# Patient Record
Sex: MALE | Race: BLACK OR AFRICAN AMERICAN | NOT HISPANIC OR LATINO | Employment: FULL TIME | RURAL
[De-identification: names, ages, dates, MRNs, and addresses within clinical notes are randomized per-mention and may not be internally consistent; named-entity substitution may affect disease eponyms.]

---

## 2017-04-05 ENCOUNTER — HISTORICAL (OUTPATIENT)
Dept: ADMINISTRATIVE | Facility: HOSPITAL | Age: 42
End: 2017-04-05

## 2017-04-06 LAB
LAB AP CLINICAL INFORMATION: NORMAL
LAB AP DIAGNOSIS - HISTORICAL: NORMAL
LAB AP GROSS PATHOLOGY - HISTORICAL: NORMAL
LAB AP SPECIMEN SUBMITTED - HISTORICAL: NORMAL

## 2020-07-30 ENCOUNTER — HISTORICAL (OUTPATIENT)
Dept: ADMINISTRATIVE | Facility: HOSPITAL | Age: 45
End: 2020-07-30

## 2021-03-23 RX ORDER — FLUCONAZOLE 200 MG/1
200 TABLET ORAL DAILY
Qty: 5 TABLET | Refills: 2 | Status: SHIPPED | OUTPATIENT
Start: 2021-03-23 | End: 2021-11-11

## 2021-03-30 RX ORDER — DAPAGLIFLOZIN AND METFORMIN HYDROCHLORIDE 5; 1000 MG/1; MG/1
1 TABLET, FILM COATED, EXTENDED RELEASE ORAL 2 TIMES DAILY
Qty: 180 TABLET | Refills: 3 | Status: SHIPPED | OUTPATIENT
Start: 2021-03-30 | End: 2021-05-06 | Stop reason: SDUPTHER

## 2021-05-06 ENCOUNTER — OFFICE VISIT (OUTPATIENT)
Dept: DIABETES SERVICES | Facility: CLINIC | Age: 46
End: 2021-05-06
Payer: COMMERCIAL

## 2021-05-06 VITALS
OXYGEN SATURATION: 97 % | SYSTOLIC BLOOD PRESSURE: 146 MMHG | WEIGHT: 220.63 LBS | BODY MASS INDEX: 32.68 KG/M2 | RESPIRATION RATE: 16 BRPM | HEIGHT: 69 IN | DIASTOLIC BLOOD PRESSURE: 108 MMHG | HEART RATE: 86 BPM

## 2021-05-06 DIAGNOSIS — Z79.4 TYPE 2 DIABETES MELLITUS WITHOUT COMPLICATION, WITH LONG-TERM CURRENT USE OF INSULIN: Primary | ICD-10-CM

## 2021-05-06 DIAGNOSIS — E11.9 TYPE 2 DIABETES MELLITUS WITHOUT COMPLICATION, WITH LONG-TERM CURRENT USE OF INSULIN: Primary | ICD-10-CM

## 2021-05-06 DIAGNOSIS — I10 HYPERTENSION, UNSPECIFIED TYPE: ICD-10-CM

## 2021-05-06 LAB
GLUCOSE SERPL-MCNC: 65 MG/DL (ref 70–110)
HBA1C MFR BLD: 5.9 %

## 2021-05-06 PROCEDURE — 99213 OFFICE O/P EST LOW 20 MIN: CPT | Mod: S$PBB,,, | Performed by: NURSE PRACTITIONER

## 2021-05-06 PROCEDURE — 83036 HEMOGLOBIN GLYCOSYLATED A1C: CPT | Mod: PBBFAC | Performed by: NURSE PRACTITIONER

## 2021-05-06 PROCEDURE — 99213 PR OFFICE/OUTPT VISIT, EST, LEVL III, 20-29 MIN: ICD-10-PCS | Mod: S$PBB,,, | Performed by: NURSE PRACTITIONER

## 2021-05-06 PROCEDURE — 3008F PR BODY MASS INDEX (BMI) DOCUMENTED: ICD-10-PCS | Mod: ,,, | Performed by: NURSE PRACTITIONER

## 2021-05-06 PROCEDURE — 99213 OFFICE O/P EST LOW 20 MIN: CPT | Mod: PBBFAC | Performed by: NURSE PRACTITIONER

## 2021-05-06 PROCEDURE — 3008F BODY MASS INDEX DOCD: CPT | Mod: ,,, | Performed by: NURSE PRACTITIONER

## 2021-05-06 PROCEDURE — 82962 GLUCOSE BLOOD TEST: CPT | Mod: PBBFAC | Performed by: NURSE PRACTITIONER

## 2021-05-06 PROCEDURE — 99213 OFFICE O/P EST LOW 20 MIN: CPT | Mod: PBBFAC,,, | Performed by: NURSE PRACTITIONER

## 2021-05-06 RX ORDER — ASPIRIN 81 MG/1
81 TABLET ORAL DAILY
COMMUNITY

## 2021-05-06 RX ORDER — CHOLECALCIFEROL (VITAMIN D3) 25 MCG
1000 TABLET ORAL 2 TIMES DAILY
COMMUNITY

## 2021-05-06 RX ORDER — DAPAGLIFLOZIN AND METFORMIN HYDROCHLORIDE 5; 1000 MG/1; MG/1
1 TABLET, FILM COATED, EXTENDED RELEASE ORAL 2 TIMES DAILY
Qty: 180 TABLET | Refills: 3 | Status: SHIPPED | OUTPATIENT
Start: 2021-05-06 | End: 2022-01-18 | Stop reason: SDUPTHER

## 2021-05-06 RX ORDER — LISINOPRIL 5 MG/1
5 TABLET ORAL DAILY
COMMUNITY
End: 2023-03-22 | Stop reason: SDUPTHER

## 2021-05-18 PROBLEM — M25.562 LEFT KNEE PAIN: Status: ACTIVE | Noted: 2021-05-18

## 2021-05-18 PROBLEM — M25.531 RIGHT WRIST PAIN: Status: ACTIVE | Noted: 2021-05-18

## 2021-05-24 RX ORDER — AZITHROMYCIN 250 MG/1
TABLET, FILM COATED ORAL
Qty: 6 TABLET | Refills: 0 | Status: SHIPPED | OUTPATIENT
Start: 2021-05-24 | End: 2021-05-29

## 2021-05-24 RX ORDER — CETIRIZINE HYDROCHLORIDE, PSEUDOEPHEDRINE HYDROCHLORIDE 5; 120 MG/1; MG/1
5-120 TABLET, FILM COATED, EXTENDED RELEASE ORAL 2 TIMES DAILY
Qty: 60 TABLET | Refills: 5 | Status: SHIPPED | OUTPATIENT
Start: 2021-05-24 | End: 2021-06-03

## 2021-05-24 RX ORDER — HYDROCODONE BITARTRATE AND ACETAMINOPHEN 7.5; 325 MG/1; MG/1
1 TABLET ORAL EVERY 4 HOURS PRN
Qty: 84 TABLET | Refills: 0 | Status: SHIPPED | OUTPATIENT
Start: 2021-05-24 | End: 2021-11-24 | Stop reason: SDUPTHER

## 2021-06-24 RX ORDER — SYRING-NEEDL,DISP,INSUL,0.3 ML 31GX15/64"
1 SYRINGE, EMPTY DISPOSABLE MISCELLANEOUS DAILY
COMMUNITY
Start: 2021-03-29 | End: 2021-12-13

## 2021-06-24 RX ORDER — ATORVASTATIN CALCIUM 80 MG/1
1 TABLET, FILM COATED ORAL DAILY
COMMUNITY
Start: 2021-03-10 | End: 2021-09-13

## 2021-06-28 ENCOUNTER — TELEPHONE (OUTPATIENT)
Dept: PRIMARY CARE CLINIC | Facility: CLINIC | Age: 46
End: 2021-06-28

## 2021-06-28 ENCOUNTER — OFFICE VISIT (OUTPATIENT)
Dept: PRIMARY CARE CLINIC | Facility: CLINIC | Age: 46
End: 2021-06-28
Payer: COMMERCIAL

## 2021-06-28 VITALS
WEIGHT: 215 LBS | DIASTOLIC BLOOD PRESSURE: 80 MMHG | RESPIRATION RATE: 14 BRPM | HEART RATE: 80 BPM | HEIGHT: 69 IN | BODY MASS INDEX: 31.84 KG/M2 | SYSTOLIC BLOOD PRESSURE: 142 MMHG | OXYGEN SATURATION: 99 %

## 2021-06-28 DIAGNOSIS — I10 HYPERTENSION, UNSPECIFIED TYPE: ICD-10-CM

## 2021-06-28 DIAGNOSIS — M25.562 LEFT KNEE PAIN, UNSPECIFIED CHRONICITY: ICD-10-CM

## 2021-06-28 DIAGNOSIS — E11.9 TYPE 2 DIABETES MELLITUS WITHOUT COMPLICATION, WITH LONG-TERM CURRENT USE OF INSULIN: ICD-10-CM

## 2021-06-28 DIAGNOSIS — Z12.5 PROSTATE CANCER SCREENING: ICD-10-CM

## 2021-06-28 DIAGNOSIS — M25.531 RIGHT WRIST PAIN: Primary | ICD-10-CM

## 2021-06-28 DIAGNOSIS — Z79.4 TYPE 2 DIABETES MELLITUS WITHOUT COMPLICATION, WITH LONG-TERM CURRENT USE OF INSULIN: ICD-10-CM

## 2021-06-28 PROCEDURE — 3008F PR BODY MASS INDEX (BMI) DOCUMENTED: ICD-10-PCS | Mod: ,,, | Performed by: FAMILY MEDICINE

## 2021-06-28 PROCEDURE — 1125F PR PAIN SEVERITY QUANTIFIED, PAIN PRESENT: ICD-10-PCS | Mod: ,,, | Performed by: FAMILY MEDICINE

## 2021-06-28 PROCEDURE — 1125F AMNT PAIN NOTED PAIN PRSNT: CPT | Mod: ,,, | Performed by: FAMILY MEDICINE

## 2021-06-28 PROCEDURE — 3008F BODY MASS INDEX DOCD: CPT | Mod: ,,, | Performed by: FAMILY MEDICINE

## 2021-06-28 PROCEDURE — 99214 OFFICE O/P EST MOD 30 MIN: CPT | Mod: ,,, | Performed by: FAMILY MEDICINE

## 2021-06-28 PROCEDURE — 99214 PR OFFICE/OUTPT VISIT, EST, LEVL IV, 30-39 MIN: ICD-10-PCS | Mod: ,,, | Performed by: FAMILY MEDICINE

## 2021-08-26 ENCOUNTER — OFFICE VISIT (OUTPATIENT)
Dept: PRIMARY CARE CLINIC | Facility: CLINIC | Age: 46
End: 2021-08-26
Payer: COMMERCIAL

## 2021-08-26 VITALS
OXYGEN SATURATION: 99 % | HEIGHT: 69 IN | BODY MASS INDEX: 31.84 KG/M2 | DIASTOLIC BLOOD PRESSURE: 80 MMHG | SYSTOLIC BLOOD PRESSURE: 132 MMHG | HEART RATE: 74 BPM | WEIGHT: 215 LBS | TEMPERATURE: 98 F | RESPIRATION RATE: 14 BRPM

## 2021-08-26 DIAGNOSIS — Z00.00 ROUTINE GENERAL MEDICAL EXAMINATION AT A HEALTH CARE FACILITY: Primary | ICD-10-CM

## 2021-08-26 PROCEDURE — 3044F PR MOST RECENT HEMOGLOBIN A1C LEVEL <7.0%: ICD-10-PCS | Mod: ,,, | Performed by: FAMILY MEDICINE

## 2021-08-26 PROCEDURE — 1126F AMNT PAIN NOTED NONE PRSNT: CPT | Mod: ,,, | Performed by: FAMILY MEDICINE

## 2021-08-26 PROCEDURE — 3044F HG A1C LEVEL LT 7.0%: CPT | Mod: ,,, | Performed by: FAMILY MEDICINE

## 2021-08-26 PROCEDURE — 3079F PR MOST RECENT DIASTOLIC BLOOD PRESSURE 80-89 MM HG: ICD-10-PCS | Mod: ,,, | Performed by: FAMILY MEDICINE

## 2021-08-26 PROCEDURE — 3075F PR MOST RECENT SYSTOLIC BLOOD PRESS GE 130-139MM HG: ICD-10-PCS | Mod: ,,, | Performed by: FAMILY MEDICINE

## 2021-08-26 PROCEDURE — 90715 PR TDAP VACCINE >7 YO, IM: ICD-10-PCS | Mod: ,,, | Performed by: FAMILY MEDICINE

## 2021-08-26 PROCEDURE — 99396 PR PREVENTIVE VISIT,EST,40-64: ICD-10-PCS | Mod: 25,,, | Performed by: FAMILY MEDICINE

## 2021-08-26 PROCEDURE — 3008F BODY MASS INDEX DOCD: CPT | Mod: ,,, | Performed by: FAMILY MEDICINE

## 2021-08-26 PROCEDURE — 99396 PREV VISIT EST AGE 40-64: CPT | Mod: 25,,, | Performed by: FAMILY MEDICINE

## 2021-08-26 PROCEDURE — 90471 PR IMMUNIZ ADMIN,1 SINGLE/COMB VAC/TOXOID: ICD-10-PCS | Mod: ,,, | Performed by: FAMILY MEDICINE

## 2021-08-26 PROCEDURE — 3079F DIAST BP 80-89 MM HG: CPT | Mod: ,,, | Performed by: FAMILY MEDICINE

## 2021-08-26 PROCEDURE — 1126F PR PAIN SEVERITY QUANTIFIED, NO PAIN PRESENT: ICD-10-PCS | Mod: ,,, | Performed by: FAMILY MEDICINE

## 2021-08-26 PROCEDURE — 90715 TDAP VACCINE 7 YRS/> IM: CPT | Mod: ,,, | Performed by: FAMILY MEDICINE

## 2021-08-26 PROCEDURE — 1159F PR MEDICATION LIST DOCUMENTED IN MEDICAL RECORD: ICD-10-PCS | Mod: ,,, | Performed by: FAMILY MEDICINE

## 2021-08-26 PROCEDURE — 90471 IMMUNIZATION ADMIN: CPT | Mod: ,,, | Performed by: FAMILY MEDICINE

## 2021-08-26 PROCEDURE — 1159F MED LIST DOCD IN RCRD: CPT | Mod: ,,, | Performed by: FAMILY MEDICINE

## 2021-08-26 PROCEDURE — 3008F PR BODY MASS INDEX (BMI) DOCUMENTED: ICD-10-PCS | Mod: ,,, | Performed by: FAMILY MEDICINE

## 2021-08-26 PROCEDURE — 3075F SYST BP GE 130 - 139MM HG: CPT | Mod: ,,, | Performed by: FAMILY MEDICINE

## 2021-09-13 RX ORDER — ATORVASTATIN CALCIUM 80 MG/1
TABLET, FILM COATED ORAL
Qty: 90 TABLET | Refills: 3 | Status: SHIPPED | OUTPATIENT
Start: 2021-09-13 | End: 2022-09-28

## 2021-11-11 RX ORDER — FLUCONAZOLE 200 MG/1
TABLET ORAL
Qty: 5 TABLET | Refills: 2 | Status: SHIPPED | OUTPATIENT
Start: 2021-11-11 | End: 2022-02-07

## 2021-11-24 RX ORDER — HYDROCODONE BITARTRATE AND ACETAMINOPHEN 7.5; 325 MG/1; MG/1
1 TABLET ORAL EVERY 4 HOURS PRN
Qty: 84 TABLET | Refills: 0 | Status: SHIPPED | OUTPATIENT
Start: 2021-11-24 | End: 2022-04-19 | Stop reason: SDUPTHER

## 2021-11-30 ENCOUNTER — OFFICE VISIT (OUTPATIENT)
Dept: PRIMARY CARE CLINIC | Facility: CLINIC | Age: 46
End: 2021-11-30
Payer: COMMERCIAL

## 2021-11-30 VITALS
WEIGHT: 220 LBS | RESPIRATION RATE: 16 BRPM | BODY MASS INDEX: 32.58 KG/M2 | OXYGEN SATURATION: 100 % | HEIGHT: 69 IN | DIASTOLIC BLOOD PRESSURE: 80 MMHG | TEMPERATURE: 98 F | HEART RATE: 95 BPM | SYSTOLIC BLOOD PRESSURE: 158 MMHG

## 2021-11-30 DIAGNOSIS — M62.830 BACK MUSCLE SPASM: Primary | ICD-10-CM

## 2021-11-30 DIAGNOSIS — I10 HYPERTENSION, UNSPECIFIED TYPE: ICD-10-CM

## 2021-11-30 DIAGNOSIS — M25.562 LEFT KNEE PAIN, UNSPECIFIED CHRONICITY: ICD-10-CM

## 2021-11-30 DIAGNOSIS — E11.9 TYPE 2 DIABETES MELLITUS WITHOUT COMPLICATION, WITHOUT LONG-TERM CURRENT USE OF INSULIN: ICD-10-CM

## 2021-11-30 PROCEDURE — 99214 OFFICE O/P EST MOD 30 MIN: CPT | Mod: 25,,, | Performed by: FAMILY MEDICINE

## 2021-11-30 PROCEDURE — 4010F PR ACE/ARB THEARPY RXD/TAKEN: ICD-10-PCS | Mod: ,,, | Performed by: FAMILY MEDICINE

## 2021-11-30 PROCEDURE — 99214 PR OFFICE/OUTPT VISIT, EST, LEVL IV, 30-39 MIN: ICD-10-PCS | Mod: 25,,, | Performed by: FAMILY MEDICINE

## 2021-11-30 PROCEDURE — 20552 NJX 1/MLT TRIGGER POINT 1/2: CPT | Mod: ,,, | Performed by: FAMILY MEDICINE

## 2021-11-30 PROCEDURE — 20552 PR INJECT TRIGGER POINT, 1 OR 2: ICD-10-PCS | Mod: ,,, | Performed by: FAMILY MEDICINE

## 2021-11-30 PROCEDURE — 4010F ACE/ARB THERAPY RXD/TAKEN: CPT | Mod: ,,, | Performed by: FAMILY MEDICINE

## 2021-11-30 RX ORDER — CYCLOBENZAPRINE HCL 10 MG
10 TABLET ORAL 3 TIMES DAILY PRN
Qty: 30 TABLET | Refills: 1 | Status: SHIPPED | OUTPATIENT
Start: 2021-11-30 | End: 2021-12-10

## 2021-11-30 RX ORDER — CELECOXIB 200 MG/1
200 CAPSULE ORAL DAILY
Qty: 30 CAPSULE | Refills: 5 | Status: SHIPPED | OUTPATIENT
Start: 2021-11-30 | End: 2022-01-03 | Stop reason: CLARIF

## 2021-11-30 RX ORDER — DEXAMETHASONE SODIUM PHOSPHATE 4 MG/ML
4 INJECTION, SOLUTION INTRA-ARTICULAR; INTRALESIONAL; INTRAMUSCULAR; INTRAVENOUS; SOFT TISSUE
Status: COMPLETED | OUTPATIENT
Start: 2021-11-30 | End: 2021-11-30

## 2021-11-30 RX ORDER — METHYLPREDNISOLONE ACETATE 80 MG/ML
80 INJECTION, SUSPENSION INTRA-ARTICULAR; INTRALESIONAL; INTRAMUSCULAR; SOFT TISSUE
Status: COMPLETED | OUTPATIENT
Start: 2021-11-30 | End: 2021-11-30

## 2021-11-30 RX ADMIN — DEXAMETHASONE SODIUM PHOSPHATE 4 MG: 4 INJECTION, SOLUTION INTRA-ARTICULAR; INTRALESIONAL; INTRAMUSCULAR; INTRAVENOUS; SOFT TISSUE at 12:11

## 2021-11-30 RX ADMIN — METHYLPREDNISOLONE ACETATE 80 MG: 80 INJECTION, SUSPENSION INTRA-ARTICULAR; INTRALESIONAL; INTRAMUSCULAR; SOFT TISSUE at 12:11

## 2022-01-18 ENCOUNTER — OFFICE VISIT (OUTPATIENT)
Dept: DIABETES SERVICES | Facility: CLINIC | Age: 47
End: 2022-01-18
Payer: COMMERCIAL

## 2022-01-18 VITALS
RESPIRATION RATE: 18 BRPM | BODY MASS INDEX: 29.95 KG/M2 | SYSTOLIC BLOOD PRESSURE: 130 MMHG | DIASTOLIC BLOOD PRESSURE: 78 MMHG | WEIGHT: 202.19 LBS | HEART RATE: 108 BPM | HEIGHT: 69 IN | OXYGEN SATURATION: 97 %

## 2022-01-18 DIAGNOSIS — E11.9 TYPE 2 DIABETES MELLITUS WITHOUT COMPLICATION, WITH LONG-TERM CURRENT USE OF INSULIN: Primary | ICD-10-CM

## 2022-01-18 DIAGNOSIS — I10 PRIMARY HYPERTENSION: ICD-10-CM

## 2022-01-18 DIAGNOSIS — Z79.4 TYPE 2 DIABETES MELLITUS WITHOUT COMPLICATION, WITH LONG-TERM CURRENT USE OF INSULIN: Primary | ICD-10-CM

## 2022-01-18 LAB
GLUCOSE SERPL-MCNC: 183 MG/DL (ref 70–110)
HBA1C MFR BLD: 6.7 % (ref 4.5–6.6)

## 2022-01-18 PROCEDURE — 1160F PR REVIEW ALL MEDS BY PRESCRIBER/CLIN PHARMACIST DOCUMENTED: ICD-10-PCS | Mod: ,,, | Performed by: NURSE PRACTITIONER

## 2022-01-18 PROCEDURE — 99214 PR OFFICE/OUTPT VISIT, EST, LEVL IV, 30-39 MIN: ICD-10-PCS | Mod: S$PBB,,, | Performed by: NURSE PRACTITIONER

## 2022-01-18 PROCEDURE — 3075F SYST BP GE 130 - 139MM HG: CPT | Mod: ,,, | Performed by: NURSE PRACTITIONER

## 2022-01-18 PROCEDURE — 3075F PR MOST RECENT SYSTOLIC BLOOD PRESS GE 130-139MM HG: ICD-10-PCS | Mod: ,,, | Performed by: NURSE PRACTITIONER

## 2022-01-18 PROCEDURE — 99214 OFFICE O/P EST MOD 30 MIN: CPT | Mod: PBBFAC | Performed by: NURSE PRACTITIONER

## 2022-01-18 PROCEDURE — 1159F MED LIST DOCD IN RCRD: CPT | Mod: ,,, | Performed by: NURSE PRACTITIONER

## 2022-01-18 PROCEDURE — 1159F PR MEDICATION LIST DOCUMENTED IN MEDICAL RECORD: ICD-10-PCS | Mod: ,,, | Performed by: NURSE PRACTITIONER

## 2022-01-18 PROCEDURE — 3078F DIAST BP <80 MM HG: CPT | Mod: ,,, | Performed by: NURSE PRACTITIONER

## 2022-01-18 PROCEDURE — 1160F RVW MEDS BY RX/DR IN RCRD: CPT | Mod: ,,, | Performed by: NURSE PRACTITIONER

## 2022-01-18 PROCEDURE — 3008F BODY MASS INDEX DOCD: CPT | Mod: ,,, | Performed by: NURSE PRACTITIONER

## 2022-01-18 PROCEDURE — 83036 HEMOGLOBIN GLYCOSYLATED A1C: CPT | Mod: PBBFAC | Performed by: NURSE PRACTITIONER

## 2022-01-18 PROCEDURE — 82962 GLUCOSE BLOOD TEST: CPT | Mod: PBBFAC | Performed by: NURSE PRACTITIONER

## 2022-01-18 PROCEDURE — 99214 OFFICE O/P EST MOD 30 MIN: CPT | Mod: S$PBB,,, | Performed by: NURSE PRACTITIONER

## 2022-01-18 PROCEDURE — 3078F PR MOST RECENT DIASTOLIC BLOOD PRESSURE < 80 MM HG: ICD-10-PCS | Mod: ,,, | Performed by: NURSE PRACTITIONER

## 2022-01-18 PROCEDURE — 3008F PR BODY MASS INDEX (BMI) DOCUMENTED: ICD-10-PCS | Mod: ,,, | Performed by: NURSE PRACTITIONER

## 2022-01-18 RX ORDER — DAPAGLIFLOZIN AND METFORMIN HYDROCHLORIDE 5; 1000 MG/1; MG/1
1 TABLET, FILM COATED, EXTENDED RELEASE ORAL 2 TIMES DAILY
Qty: 180 TABLET | Refills: 3 | Status: SHIPPED | OUTPATIENT
Start: 2022-01-18 | End: 2022-10-19 | Stop reason: SDUPTHER

## 2022-01-18 NOTE — PATIENT INSTRUCTIONS
Snacks with 0-5 grams carbs   Hard Boiled Egg   Crystal Light, Vitamin Water, Powerade Zero   Herbal tea, unsweetened   8 oz unsweetened almond milk   2 tsp peanut butter on celery   ½ cup sugar-free Jell-O   1 sugar-free popsicle   Non starchy vegetables such as carrots or celery sticks with lowfat dressing   ½ oz lowfat cheese or string cheese   1 closed handful of nuts or tbsp of seeds, unsalted    Snacks with 15 gram carbs  . 1 small piece of fruit or . banana or . cup light canned fruit  . 3 deion cracker squares  . 3 cups popcorn  . 5 Vanilla Wafers  . 1/2 cup low fat, no added sugar ice cream or frozen yogurt  . 1/2 turkey, ham, or chicken sandwich  . 1.2 cup fruit with 1/2 cup of cottage cheese  . 4-6 unsalted wheat crackers with 1 oz low fat cheese or 1 tbsp peanut butter  . 30 goldfish crackers  . 7-8 mini rice cakes  . 1/3 cup hummus dip with raw vegetables  . 1/2 whole wheat eladia, 1 tbsp hummus  . Mini pizza (. whole wheat English muffin, low-fat cheese, tomato sauce)  . 100 calorie snack pack  . 4-6 oz light yogurt  . 1/2 cup sugar-free pudding

## 2022-01-18 NOTE — PROGRESS NOTES
Subjective:       Patient ID: Mike Mims is a 46 y.o. male.    Chief Complaint: Diabetes Mellitus (Pt here for follow up visit and A1c, reports he has been sick about 10 days, but tested negative for covid.  Checks sugar at least 1 x day.)    Here today for routine evaluation and med refill.  Denies complaints of presently.  Has had illness with steroid injection and had some elevation in readings, but has improved back to his usual control.     Review of Systems   Constitutional: Negative for activity change, appetite change, diaphoresis and fatigue.   HENT: Negative for nasal congestion, facial swelling and sinus pressure/congestion.    Eyes: Negative for visual disturbance.   Respiratory: Negative for shortness of breath and wheezing.    Cardiovascular: Negative for chest pain and leg swelling.   Gastrointestinal: Negative for constipation, diarrhea, nausea and vomiting.   Endocrine: Negative for polydipsia, polyphagia and polyuria.   Genitourinary: Negative for dysuria, frequency and urgency.   Musculoskeletal: Positive for arthralgias, joint swelling and myalgias. Negative for gait problem.   Integumentary:  Negative for color change, rash and wound.   Neurological: Negative for dizziness, syncope, weakness, headaches, disturbances in coordination and coordination difficulties.   Hematological: Does not bruise/bleed easily.   Psychiatric/Behavioral: Negative for self-injury, sleep disturbance and suicidal ideas. The patient is not nervous/anxious.          Objective:      Physical Exam  Vitals and nursing note reviewed.   Constitutional:       General: He is not in acute distress.     Appearance: Normal appearance.   HENT:      Head: Normocephalic.      Nose: Nose normal. No congestion or rhinorrhea.      Mouth/Throat:      Mouth: Mucous membranes are moist.      Pharynx: Oropharynx is clear.   Eyes:      General:         Right eye: No discharge.         Left eye: No discharge.      Conjunctiva/sclera:  Conjunctivae normal.   Neck:      Vascular: No carotid bruit.   Cardiovascular:      Rate and Rhythm: Normal rate and regular rhythm.      Pulses: Normal pulses.      Heart sounds: Normal heart sounds. No murmur heard.  No friction rub.   Pulmonary:      Effort: Pulmonary effort is normal. No respiratory distress.      Breath sounds: Normal breath sounds. No stridor. No wheezing, rhonchi or rales.   Abdominal:      General: Abdomen is flat. Bowel sounds are normal. There is no distension.      Palpations: Abdomen is soft.   Musculoskeletal:         General: No swelling. Normal range of motion.      Cervical back: Normal range of motion and neck supple.      Right foot: Normal range of motion. No deformity.      Left foot: Normal range of motion. No deformity.   Feet:      Right foot:      Skin integrity: Skin integrity normal. No ulcer or callus.      Toenail Condition: Right toenails are normal.      Left foot:      Skin integrity: Skin integrity normal. No ulcer or callus.      Toenail Condition: Left toenails are normal.   Lymphadenopathy:      Cervical: No cervical adenopathy.   Skin:     General: Skin is warm and dry.      Capillary Refill: Capillary refill takes less than 2 seconds.      Coloration: Skin is not cyanotic or mottled.      Findings: No lesion, rash or wound.   Neurological:      General: No focal deficit present.      Mental Status: He is alert and oriented to person, place, and time. Mental status is at baseline.   Psychiatric:         Mood and Affect: Mood normal.         Behavior: Behavior normal.         Assessment:       Problem List Items Addressed This Visit        Cardiac/Vascular    HTN (hypertension)       Endocrine    Type 2 diabetes mellitus, with long-term current use of insulin - Primary    Relevant Medications    insulin detemir U-100 (LEVEMIR) 100 unit/mL injection    SITagliptin (JANUVIA) 100 MG Tab    Other Relevant Orders    Hemoglobin A1C, POCT (Completed)    POCT Glucose,  Hand-Held Device (Completed)          Plan:       Problem List Items Addressed This Visit        Cardiac/Vascular    HTN (hypertension)       Endocrine    Type 2 diabetes mellitus, with long-term current use of insulin - Primary    Relevant Medications    insulin detemir U-100 (LEVEMIR) 100 unit/mL injection    SITagliptin (JANUVIA) 100 MG Tab    Other Relevant Orders    Hemoglobin A1C, POCT (Completed)    POCT Glucose, Hand-Held Device (Completed)

## 2022-02-07 RX ORDER — FLUCONAZOLE 200 MG/1
TABLET ORAL
Qty: 5 TABLET | Refills: 2 | Status: SHIPPED | OUTPATIENT
Start: 2022-02-07 | End: 2022-08-09

## 2022-04-05 RX ORDER — PANTOPRAZOLE SODIUM 40 MG/1
TABLET, DELAYED RELEASE ORAL
Qty: 90 TABLET | Refills: 4 | Status: SHIPPED | OUTPATIENT
Start: 2022-04-05 | End: 2023-03-22 | Stop reason: SDUPTHER

## 2022-04-19 RX ORDER — HYDROCODONE BITARTRATE AND ACETAMINOPHEN 7.5; 325 MG/1; MG/1
1 TABLET ORAL EVERY 4 HOURS PRN
Qty: 84 TABLET | Refills: 0 | Status: SHIPPED | OUTPATIENT
Start: 2022-04-19 | End: 2022-08-09 | Stop reason: SDUPTHER

## 2022-07-18 ENCOUNTER — OFFICE VISIT (OUTPATIENT)
Dept: DIABETES SERVICES | Facility: CLINIC | Age: 47
End: 2022-07-18
Payer: COMMERCIAL

## 2022-07-18 VITALS
DIASTOLIC BLOOD PRESSURE: 96 MMHG | SYSTOLIC BLOOD PRESSURE: 146 MMHG | HEART RATE: 78 BPM | RESPIRATION RATE: 16 BRPM | HEIGHT: 69 IN | WEIGHT: 211 LBS | BODY MASS INDEX: 31.25 KG/M2 | OXYGEN SATURATION: 97 %

## 2022-07-18 DIAGNOSIS — I10 PRIMARY HYPERTENSION: ICD-10-CM

## 2022-07-18 DIAGNOSIS — Z79.4 TYPE 2 DIABETES MELLITUS WITHOUT COMPLICATION, WITH LONG-TERM CURRENT USE OF INSULIN: Primary | ICD-10-CM

## 2022-07-18 DIAGNOSIS — E11.9 TYPE 2 DIABETES MELLITUS WITHOUT COMPLICATION, WITH LONG-TERM CURRENT USE OF INSULIN: Primary | ICD-10-CM

## 2022-07-18 DIAGNOSIS — E78.5 HYPERLIPIDEMIA, UNSPECIFIED HYPERLIPIDEMIA TYPE: ICD-10-CM

## 2022-07-18 LAB
GLUCOSE SERPL-MCNC: 79 MG/DL (ref 70–110)
HBA1C MFR BLD: 6.4 % (ref 4.5–6.6)

## 2022-07-18 PROCEDURE — 82962 GLUCOSE BLOOD TEST: CPT | Mod: PBBFAC | Performed by: NURSE PRACTITIONER

## 2022-07-18 PROCEDURE — 3077F PR MOST RECENT SYSTOLIC BLOOD PRESSURE >= 140 MM HG: ICD-10-PCS | Mod: ,,, | Performed by: NURSE PRACTITIONER

## 2022-07-18 PROCEDURE — 83036 HEMOGLOBIN GLYCOSYLATED A1C: CPT | Mod: PBBFAC | Performed by: NURSE PRACTITIONER

## 2022-07-18 PROCEDURE — 3080F PR MOST RECENT DIASTOLIC BLOOD PRESSURE >= 90 MM HG: ICD-10-PCS | Mod: ,,, | Performed by: NURSE PRACTITIONER

## 2022-07-18 PROCEDURE — 1160F RVW MEDS BY RX/DR IN RCRD: CPT | Mod: ,,, | Performed by: NURSE PRACTITIONER

## 2022-07-18 PROCEDURE — 99215 OFFICE O/P EST HI 40 MIN: CPT | Mod: PBBFAC | Performed by: NURSE PRACTITIONER

## 2022-07-18 PROCEDURE — 3077F SYST BP >= 140 MM HG: CPT | Mod: ,,, | Performed by: NURSE PRACTITIONER

## 2022-07-18 PROCEDURE — 3008F BODY MASS INDEX DOCD: CPT | Mod: ,,, | Performed by: NURSE PRACTITIONER

## 2022-07-18 PROCEDURE — 99214 OFFICE O/P EST MOD 30 MIN: CPT | Mod: S$PBB,,, | Performed by: NURSE PRACTITIONER

## 2022-07-18 PROCEDURE — 99214 PR OFFICE/OUTPT VISIT, EST, LEVL IV, 30-39 MIN: ICD-10-PCS | Mod: S$PBB,,, | Performed by: NURSE PRACTITIONER

## 2022-07-18 PROCEDURE — 1160F PR REVIEW ALL MEDS BY PRESCRIBER/CLIN PHARMACIST DOCUMENTED: ICD-10-PCS | Mod: ,,, | Performed by: NURSE PRACTITIONER

## 2022-07-18 PROCEDURE — 3080F DIAST BP >= 90 MM HG: CPT | Mod: ,,, | Performed by: NURSE PRACTITIONER

## 2022-07-18 PROCEDURE — 1159F PR MEDICATION LIST DOCUMENTED IN MEDICAL RECORD: ICD-10-PCS | Mod: ,,, | Performed by: NURSE PRACTITIONER

## 2022-07-18 PROCEDURE — 3008F PR BODY MASS INDEX (BMI) DOCUMENTED: ICD-10-PCS | Mod: ,,, | Performed by: NURSE PRACTITIONER

## 2022-07-18 PROCEDURE — 1159F MED LIST DOCD IN RCRD: CPT | Mod: ,,, | Performed by: NURSE PRACTITIONER

## 2022-07-18 RX ORDER — ASCORBIC ACID 500 MG
500 TABLET ORAL DAILY
COMMUNITY

## 2022-07-18 NOTE — PROGRESS NOTES
Subjective:       Patient ID: Mike Mims is a 47 y.o. male.    Chief Complaint: No chief complaint on file.    Here today for routine evaluation and med refill.  Lab Results       Component                Value               Date                       HGBA1C                   6.4                 07/18/2022            No results found for: WENDI [id number]  Lab Results       Component                Value               Date                       CHOL                     110                 08/26/2021                 CHOL                     143                 06/28/2021            Lab Results       Component                Value               Date                       HDL                      37 (L)              08/26/2021                 HDL                      56                  06/28/2021            Lab Results       Component                Value               Date                       LDLCALC                  62                  08/26/2021                 LDLCALC                  78                  06/28/2021            Lab Results       Component                Value               Date                       TRIG                     57                  08/26/2021                 TRIG                     47                  06/28/2021            Lab Results       Component                Value               Date                       CHOLHDL                  3.0                 08/26/2021                 CHOLHDL                  2.6                 06/28/2021            CMP  Sodium       Date                     Value               Ref Range           Status                06/28/2021               140                 136 - 145 mmol*     Final            ----------  Potassium       Date                     Value               Ref Range           Status                06/28/2021               4.6                 3.5 - 5.1 mmol*     Final            ----------  Chloride       Date                      Value               Ref Range           Status                06/28/2021               106                 98 - 107 mmol/L     Final            ----------  CO2       Date                     Value               Ref Range           Status                06/28/2021               30                  21 - 32 mmol/L      Final            ----------  Glucose       Date                     Value               Ref Range           Status                08/26/2021               136 (H)             74 - 106 mg/dL      Final            ----------  BUN       Date                     Value               Ref Range           Status                06/28/2021               21 (H)              7 - 18 mg/dL        Final            ----------  Creatinine       Date                     Value               Ref Range           Status                06/28/2021               1.19                0.70 - 1.30 mg*     Final            ----------  Calcium       Date                     Value               Ref Range           Status                06/28/2021               9.9                 8.5 - 10.1 mg/*     Final            ----------  Total Protein       Date                     Value               Ref Range           Status                06/28/2021               8.0                 6.4 - 8.2 g/dL      Final            ----------  Albumin       Date                     Value               Ref Range           Status                06/28/2021               4.3                 3.5 - 5.0 g/dL      Final            ----------  Bilirubin, Total       Date                     Value               Ref Range           Status                06/28/2021               0.8                 >0.0 - 1.2 mg/*     Final            ----------  Alk Phos       Date                     Value               Ref Range           Status                06/28/2021               84                  45 - 115 U/L        Final            ----------  AST       Date                      Value               Ref Range           Status                06/28/2021               16                  15 - 37 U/L         Final            ----------  ALT       Date                     Value               Ref Range           Status                06/28/2021               26                  16 - 61 U/L         Final            ----------  Anion Gap       Date                     Value               Ref Range           Status                06/28/2021               9                   7 - 16 mmol/L       Final            ----------  eGFR        Date                     Value               Ref Range           Status                06/28/2021               84                  >=60 mL/min/1.*     Final            ----------    127/79 is home bp readings, drank sugar free bang this am and with elevated pressure today.     Review of Systems   Constitutional: Negative for activity change, appetite change, diaphoresis and fatigue.   HENT: Negative for nasal congestion, facial swelling and sinus pressure/congestion.    Eyes: Negative for visual disturbance.   Respiratory: Negative for shortness of breath and wheezing.    Cardiovascular: Negative for chest pain and leg swelling.   Gastrointestinal: Negative for constipation, diarrhea, nausea and vomiting.   Endocrine: Negative for polydipsia, polyphagia and polyuria.   Genitourinary: Negative for dysuria, frequency and urgency.   Musculoskeletal: Positive for arthralgias. Negative for gait problem, joint swelling and myalgias.   Integumentary:  Negative for color change, rash and wound.   Neurological: Negative for dizziness, syncope, weakness, headaches, disturbances in coordination and coordination difficulties.   Hematological: Does not bruise/bleed easily.   Psychiatric/Behavioral: Negative for self-injury, sleep disturbance and suicidal ideas. The patient is not nervous/anxious.          Objective:      Physical Exam  Vitals and nursing note  reviewed.   Constitutional:       General: He is not in acute distress.     Appearance: Normal appearance.   HENT:      Head: Normocephalic.      Nose: Nose normal. No congestion or rhinorrhea.      Mouth/Throat:      Mouth: Mucous membranes are moist.      Pharynx: Oropharynx is clear.   Eyes:      General:         Right eye: No discharge.         Left eye: No discharge.      Conjunctiva/sclera: Conjunctivae normal.   Neck:      Vascular: No carotid bruit.   Cardiovascular:      Rate and Rhythm: Normal rate and regular rhythm.      Pulses: Normal pulses.      Heart sounds: Normal heart sounds. No murmur heard.    No friction rub.   Pulmonary:      Effort: Pulmonary effort is normal. No respiratory distress.      Breath sounds: Normal breath sounds. No stridor. No wheezing, rhonchi or rales.   Abdominal:      General: Abdomen is flat. Bowel sounds are normal. There is no distension.      Palpations: Abdomen is soft.   Musculoskeletal:         General: No swelling. Normal range of motion.      Cervical back: Normal range of motion and neck supple.      Right foot: Normal range of motion. No deformity.      Left foot: Normal range of motion. No deformity.   Feet:      Right foot:      Skin integrity: Skin integrity normal. No ulcer or callus.      Toenail Condition: Right toenails are normal.      Left foot:      Skin integrity: Skin integrity normal. No ulcer or callus.      Toenail Condition: Left toenails are normal.   Lymphadenopathy:      Cervical: No cervical adenopathy.   Skin:     General: Skin is warm and dry.      Capillary Refill: Capillary refill takes less than 2 seconds.      Coloration: Skin is not cyanotic or mottled.      Findings: No lesion, rash or wound.   Neurological:      General: No focal deficit present.      Mental Status: He is alert and oriented to person, place, and time. Mental status is at baseline.   Psychiatric:         Mood and Affect: Mood normal.         Behavior: Behavior normal.          Assessment:       Problem List Items Addressed This Visit        Cardiac/Vascular    HTN (hypertension)    Hyperlipidemia       Endocrine    Type 2 diabetes mellitus, with long-term current use of insulin - Primary          Plan:       Problem List Items Addressed This Visit        Cardiac/Vascular    HTN (hypertension)    Hyperlipidemia       Endocrine    Type 2 diabetes mellitus, with long-term current use of insulin - Primary           check blood pressure 3-5 times per week and call if uncontrolled greater than 140/84.     Pt is advised to monitor and document glucose fasting when you wake up before you eat and 2 hours after meal and bring in meter to next visit.      Ensure to take medications as directed.      Follow diabetic diet as directed.      Work to achieve normal body weight.     Ensure to exercise 4-5 times per week for 20 minutes.      No

## 2022-07-18 NOTE — PATIENT INSTRUCTIONS
Pt is advised to monitor and document glucose fasting when you wake up before you eat and 2 hours after meal and bring in meter to next visit.      Ensure to take medications as directed.      Follow diabetic diet as directed.      Work to achieve normal body weight.     Ensure to exercise 4-5 times per week for 20 minutes.      Avoid drinks with caffeine    Check blood pressure at home 3-5 times per week.     Decrease salt in diet, increase water to  oz per day.

## 2022-08-09 ENCOUNTER — OFFICE VISIT (OUTPATIENT)
Dept: PRIMARY CARE CLINIC | Facility: CLINIC | Age: 47
End: 2022-08-09
Payer: COMMERCIAL

## 2022-08-09 VITALS
SYSTOLIC BLOOD PRESSURE: 128 MMHG | HEIGHT: 69 IN | BODY MASS INDEX: 32.73 KG/M2 | OXYGEN SATURATION: 97 % | RESPIRATION RATE: 18 BRPM | HEART RATE: 84 BPM | DIASTOLIC BLOOD PRESSURE: 76 MMHG | WEIGHT: 221 LBS

## 2022-08-09 DIAGNOSIS — K92.1 BLOOD IN STOOL: ICD-10-CM

## 2022-08-09 DIAGNOSIS — E78.5 HYPERLIPIDEMIA, UNSPECIFIED HYPERLIPIDEMIA TYPE: ICD-10-CM

## 2022-08-09 DIAGNOSIS — Z12.5 PROSTATE CANCER SCREENING: ICD-10-CM

## 2022-08-09 DIAGNOSIS — E11.01 TYPE 2 DIABETES MELLITUS WITH HYPEROSMOLAR COMA, WITH LONG-TERM CURRENT USE OF INSULIN: ICD-10-CM

## 2022-08-09 DIAGNOSIS — I10 PRIMARY HYPERTENSION: Primary | ICD-10-CM

## 2022-08-09 DIAGNOSIS — Z79.4 TYPE 2 DIABETES MELLITUS WITH HYPEROSMOLAR COMA, WITH LONG-TERM CURRENT USE OF INSULIN: ICD-10-CM

## 2022-08-09 DIAGNOSIS — L82.0 INFLAMED SEBORRHEIC KERATOSIS: ICD-10-CM

## 2022-08-09 PROCEDURE — 3066F NEPHROPATHY DOC TX: CPT | Mod: ,,, | Performed by: FAMILY MEDICINE

## 2022-08-09 PROCEDURE — 99214 OFFICE O/P EST MOD 30 MIN: CPT | Mod: ,,, | Performed by: FAMILY MEDICINE

## 2022-08-09 PROCEDURE — 3074F SYST BP LT 130 MM HG: CPT | Mod: ,,, | Performed by: FAMILY MEDICINE

## 2022-08-09 PROCEDURE — 3008F PR BODY MASS INDEX (BMI) DOCUMENTED: ICD-10-PCS | Mod: ,,, | Performed by: FAMILY MEDICINE

## 2022-08-09 PROCEDURE — 3008F BODY MASS INDEX DOCD: CPT | Mod: ,,, | Performed by: FAMILY MEDICINE

## 2022-08-09 PROCEDURE — 3061F NEG MICROALBUMINURIA REV: CPT | Mod: ,,, | Performed by: FAMILY MEDICINE

## 2022-08-09 PROCEDURE — 3066F PR DOCUMENTATION OF TREATMENT FOR NEPHROPATHY: ICD-10-PCS | Mod: ,,, | Performed by: FAMILY MEDICINE

## 2022-08-09 PROCEDURE — 3078F PR MOST RECENT DIASTOLIC BLOOD PRESSURE < 80 MM HG: ICD-10-PCS | Mod: ,,, | Performed by: FAMILY MEDICINE

## 2022-08-09 PROCEDURE — 3061F PR NEG MICROALBUMINURIA RESULT DOCUMENTED/REVIEW: ICD-10-PCS | Mod: ,,, | Performed by: FAMILY MEDICINE

## 2022-08-09 PROCEDURE — 99214 PR OFFICE/OUTPT VISIT, EST, LEVL IV, 30-39 MIN: ICD-10-PCS | Mod: ,,, | Performed by: FAMILY MEDICINE

## 2022-08-09 PROCEDURE — 3074F PR MOST RECENT SYSTOLIC BLOOD PRESSURE < 130 MM HG: ICD-10-PCS | Mod: ,,, | Performed by: FAMILY MEDICINE

## 2022-08-09 PROCEDURE — 3078F DIAST BP <80 MM HG: CPT | Mod: ,,, | Performed by: FAMILY MEDICINE

## 2022-08-09 RX ORDER — CELECOXIB 200 MG/1
200 CAPSULE ORAL 2 TIMES DAILY
Qty: 60 CAPSULE | Refills: 5 | Status: SHIPPED | OUTPATIENT
Start: 2022-08-09 | End: 2023-03-22 | Stop reason: SDUPTHER

## 2022-08-09 RX ORDER — HYDROCORTISONE 25 MG/G
CREAM TOPICAL 2 TIMES DAILY
Qty: 28 G | Refills: 12 | Status: SHIPPED | OUTPATIENT
Start: 2022-08-09 | End: 2022-10-19

## 2022-08-09 RX ORDER — CELECOXIB 200 MG/1
200 CAPSULE ORAL DAILY
Qty: 30 CAPSULE | Refills: 5 | Status: SHIPPED | OUTPATIENT
Start: 2022-08-09 | End: 2022-08-09

## 2022-08-09 RX ORDER — HYDROCODONE BITARTRATE AND ACETAMINOPHEN 7.5; 325 MG/1; MG/1
1 TABLET ORAL EVERY 4 HOURS PRN
Qty: 84 TABLET | Refills: 0 | Status: SHIPPED | OUTPATIENT
Start: 2022-08-09 | End: 2022-10-19

## 2022-08-09 NOTE — PROGRESS NOTES
"Subjective:      Patient ID: Mike Mims is a 47 y.o. male.    Chief Complaint: Rectal Bleeding and Back Pain    Mike arreola 47 y.o. male presents for follow up on all regular problems which are reviewed and discussed.   Overdue for c scope  Hx of hemmrhoids  Hurt back mowing  Fu regular probs  Problem List Items Addressed This Visit        Cardiac/Vascular    HTN (hypertension) - Primary    Hyperlipidemia       Endocrine    Type 2 diabetes mellitus, with long-term current use of insulin          Past Medical History:  Past Medical History:   Diagnosis Date    Diabetes mellitus type I     HTN (hypertension)     Hyperlipidemia      Past Surgical History:   Procedure Laterality Date    CHOLECYSTECTOMY      HERNIA REPAIR      ROTATOR CUFF REPAIR       Review of patient's allergies indicates:  No Known Allergies  Current Outpatient Medications on File Prior to Visit   Medication Sig Dispense Refill    ascorbic acid, vitamin C, (VITAMIN C) 500 MG tablet Take 500 mg by mouth once daily.      aspirin (ECOTRIN) 81 MG EC tablet Take 81 mg by mouth once daily.      atorvastatin (LIPITOR) 80 MG tablet TAKE 1 TABLET BY MOUTH EVERY DAY 90 tablet 3    BD VEO INSULIN SYRINGE UF 1 mL 31 gauge x 15/64" Syrg USE 3 TIMES A DAY TO INJECT INSULIN 100 each 3    dapagliflozin-metformin (XIGDUO XR) 5-1,000 mg TBph Take 1 tablet by mouth 2 (two) times a day. 180 tablet 3    HYDROcodone-acetaminophen (NORCO) 7.5-325 mg per tablet Take 1 tablet by mouth every 4 (four) hours as needed for Pain. 84 tablet 0    insulin detemir U-100 (LEVEMIR) 100 unit/mL injection Inject 45 Units into the skin once daily. (Patient taking differently: Inject 45 Units into the skin once daily. Pt takes 40 - 50 units) 45 mL 3    lisinopriL (PRINIVIL,ZESTRIL) 5 MG tablet Take 5 mg by mouth once daily.      meloxicam (MOBIC) 15 MG tablet TAKE 1 TABLET BY MOUTH EVERY DAY AS NEEDED FOR PAIN . AVOID ALL OTHER NSAIDS WHILE TAKING MELOXICAM 30 tablet " "2    pantoprazole (PROTONIX) 40 MG tablet TAKE 1 TABLET BY MOUTH EVERY DAY 90 tablet 4    SITagliptin (JANUVIA) 100 MG Tab Take 1 tablet (100 mg total) by mouth once daily. 90 tablet 3    vitamin D (VITAMIN D3) 1000 units Tab Take 1,000 Units by mouth 2 (two) times a day.      [DISCONTINUED] fluconazole (DIFLUCAN) 200 MG Tab TAKE 1 TABLET BY MOUTH EVERY DAY (Patient not taking: Reported on 7/18/2022) 5 tablet 2     No current facility-administered medications on file prior to visit.     Social History     Socioeconomic History    Marital status:      Spouse name: andrei    Number of children: 3    Years of education: 16    Highest education level: Bachelor's degree (e.g., BA, AB, BS)   Tobacco Use    Smoking status: Never Smoker    Smokeless tobacco: Never Used   Substance and Sexual Activity    Alcohol use: Yes     Comment: occasional liquor    Drug use: Never    Sexual activity: Yes     Family History   Problem Relation Age of Onset    Diabetes Mother     Diabetes Father     Prostate cancer Father     Diabetes Sister     Diabetes Brother     Diabetes Sister     Depression Sister     No Known Problems Sister        Review of Systems   Constitutional: Negative.    HENT: Negative for congestion, ear pain, nosebleeds and trouble swallowing.    Eyes: Negative for pain and itching.   Respiratory: Negative for chest tightness.    Cardiovascular: Negative for chest pain.   Gastrointestinal: Negative for abdominal distention.   Endocrine: Negative for cold intolerance and heat intolerance.   Genitourinary: Negative for difficulty urinating.   Musculoskeletal: Negative for arthralgias.   Neurological: Negative for dizziness.       Objective:     /76 (BP Location: Left arm, Patient Position: Sitting, BP Method: Large (Manual))   Pulse 84   Resp 18   Ht 5' 9" (1.753 m)   Wt 100.2 kg (221 lb)   SpO2 97%   BMI 32.64 kg/m²     Physical Exam  Constitutional:       Appearance: Normal " appearance. He is obese.   HENT:      Head: Normocephalic and atraumatic.      Right Ear: External ear normal.      Left Ear: External ear normal.      Nose: Nose normal.      Mouth/Throat:      Mouth: Mucous membranes are moist.      Pharynx: Oropharynx is clear.   Eyes:      Pupils: Pupils are equal, round, and reactive to light.   Cardiovascular:      Rate and Rhythm: Normal rate and regular rhythm.      Heart sounds: Normal heart sounds.   Pulmonary:      Effort: Pulmonary effort is normal.      Breath sounds: Normal breath sounds.   Abdominal:      Palpations: Abdomen is soft.   Musculoskeletal:         General: Normal range of motion.      Cervical back: Normal range of motion and neck supple.   Skin:     General: Skin is warm and dry.   Neurological:      General: No focal deficit present.      Mental Status: He is alert.   Psychiatric:         Mood and Affect: Mood normal.         Behavior: Behavior normal.         Thought Content: Thought content normal.         Judgment: Judgment normal.       Assessment:     1. Primary hypertension    2. Hyperlipidemia, unspecified hyperlipidemia type    3. Type 2 diabetes mellitus with hyperosmolar coma, with long-term current use of insulin        Plan:     Problem List Items Addressed This Visit        Cardiac/Vascular    HTN (hypertension) - Primary    Hyperlipidemia       Endocrine    Type 2 diabetes mellitus, with long-term current use of insulin        No follow-ups on file.  3m fu plus lab    I am having Mike Mims maintain his lisinopriL, vitamin D, aspirin, atorvastatin, BD VEO INSULIN SYRINGE UF, dapagliflozin-metformin, insulin detemir U-100, SITagliptin, meloxicam, pantoprazole, HYDROcodone-acetaminophen, and ascorbic acid (vitamin C).    Mike was seen today for rectal bleeding and back pain.    Diagnoses and all orders for this visit:    Primary hypertension    Hyperlipidemia, unspecified hyperlipidemia type    Type 2 diabetes mellitus with hyperosmolar  coma, with long-term current use of insulin         [unfilled]  No orders of the defined types were placed in this encounter.

## 2022-08-22 ENCOUNTER — OFFICE VISIT (OUTPATIENT)
Dept: SURGERY | Facility: CLINIC | Age: 47
End: 2022-08-22
Attending: FAMILY MEDICINE
Payer: COMMERCIAL

## 2022-08-22 DIAGNOSIS — L82.0 INFLAMED SEBORRHEIC KERATOSIS: ICD-10-CM

## 2022-08-22 DIAGNOSIS — L98.9 SKIN LESIONS: ICD-10-CM

## 2022-08-22 DIAGNOSIS — Z01.818 ENCOUNTER FOR OTHER PREPROCEDURAL EXAMINATION: Primary | ICD-10-CM

## 2022-08-22 PROCEDURE — 3061F NEG MICROALBUMINURIA REV: CPT | Mod: ,,, | Performed by: SURGERY

## 2022-08-22 PROCEDURE — 99214 OFFICE O/P EST MOD 30 MIN: CPT | Mod: PBBFAC | Performed by: SURGERY

## 2022-08-22 PROCEDURE — 99204 PR OFFICE/OUTPT VISIT, NEW, LEVL IV, 45-59 MIN: ICD-10-PCS | Mod: S$PBB,,, | Performed by: SURGERY

## 2022-08-22 PROCEDURE — 3061F PR NEG MICROALBUMINURIA RESULT DOCUMENTED/REVIEW: ICD-10-PCS | Mod: ,,, | Performed by: SURGERY

## 2022-08-22 PROCEDURE — 1159F MED LIST DOCD IN RCRD: CPT | Mod: ,,, | Performed by: SURGERY

## 2022-08-22 PROCEDURE — 99204 OFFICE O/P NEW MOD 45 MIN: CPT | Mod: S$PBB,,, | Performed by: SURGERY

## 2022-08-22 PROCEDURE — 1159F PR MEDICATION LIST DOCUMENTED IN MEDICAL RECORD: ICD-10-PCS | Mod: ,,, | Performed by: SURGERY

## 2022-08-22 PROCEDURE — 3066F PR DOCUMENTATION OF TREATMENT FOR NEPHROPATHY: ICD-10-PCS | Mod: ,,, | Performed by: SURGERY

## 2022-08-22 PROCEDURE — 3066F NEPHROPATHY DOC TX: CPT | Mod: ,,, | Performed by: SURGERY

## 2022-08-22 NOTE — PATIENT INSTRUCTIONS
[facility]  [address] [place] [address]      YOUR SURGERY DATE IS 09/01/2022     LABWORK IS SCHEDULED FOR TODAY. PLEASE SIGN IN ON 1ST FLOOR OF THE  [facility] FOR LAB.       DO NOT EAT OR DRINK ANYTHING AFTER MIDNIGHT BEFORE YOU SURGERY    BRING ALL MEDICATIONS YOU ARE CURRENTLY TAKING WITH YOU.  IF YOU ARE TAKING  A BLOOD PRESSURE MEDICATION, TAKE YOUR BLOOD PRESSURE MEDICATION WITH A   SIP OF WATER WHEN YOU GET UP THE MORNING OF SURGERY.     YOU MUST PRE-ADMIT AT THE FOLLOWING WEBSITE:  WEBSITE: [email or web address]  PASSWORD: [email or web address]    A STAFF MEMBER FROM THE [facility] WILL CALL YOU THE DAY BEFORE  SURGERY TO LET YOU KNOW WHAT TIME TO ARRIVE THE MORNING OF SURGERY.  IF YOU HAVE NOT HEARD FROM THEM BY 4 P.M. THE DAY BEFORE YOUR SURGERY,   PLEASE CALL THEM [phone].      IF YOU HAVE ANY QUESTIONS YOU MAY CONTACT OUR OFFICE -[phone].

## 2022-08-22 NOTE — PROGRESS NOTES
"                                                                                              General Surgery History and Physical      Patient ID: Mike Mims is a 47 y.o. male.    Chief Complaint: Lesion      HPI:  47-year-old male who complains of some nodules on his skin that is been present for a few years now.  He says they get pretty painful inflamed that require him to just placed a bandage on them in the get better.  He has had them excised many years ago by Dr. Lopes however they still flare up after the excision.  He states that they have been intermittent few times a month.    Review of Systems   Constitutional: Negative for activity change, appetite change, fatigue and fever.   HENT: Negative for trouble swallowing.    Respiratory: Negative for cough and shortness of breath.    Cardiovascular: Negative for chest pain and palpitations.   Gastrointestinal: Negative for abdominal distention, abdominal pain, blood in stool, constipation and diarrhea.   Genitourinary: Negative for flank pain.   Musculoskeletal: Negative for neck pain and neck stiffness.   Skin: Positive for color change. Negative for wound.   Neurological: Negative for weakness.       Current Outpatient Medications   Medication Sig Dispense Refill    ascorbic acid, vitamin C, (VITAMIN C) 500 MG tablet Take 500 mg by mouth once daily.      aspirin (ECOTRIN) 81 MG EC tablet Take 81 mg by mouth once daily.      atorvastatin (LIPITOR) 80 MG tablet TAKE 1 TABLET BY MOUTH EVERY DAY 90 tablet 3    BD VEO INSULIN SYRINGE UF 1 mL 31 gauge x 15/64" Syrg USE 3 TIMES A DAY TO INJECT INSULIN 100 each 3    celecoxib (CELEBREX) 200 MG capsule Take 1 capsule (200 mg total) by mouth 2 (two) times daily. 60 capsule 5    dapagliflozin-metformin (XIGDUO XR) 5-1,000 mg TBph Take 1 tablet by mouth 2 (two) times a day. 180 tablet 3    HYDROcodone-acetaminophen (NORCO) 7.5-325 mg per tablet Take 1 tablet by mouth every 4 (four) hours as needed for Pain. " 84 tablet 0    hydrocortisone 2.5 % cream Apply topically 2 (two) times daily. 28 g 12    insulin detemir U-100 (LEVEMIR) 100 unit/mL injection Inject 45 Units into the skin once daily. (Patient taking differently: Inject 45 Units into the skin once daily. Pt takes 40 - 50 units) 45 mL 3    lisinopriL (PRINIVIL,ZESTRIL) 5 MG tablet Take 5 mg by mouth once daily.      meloxicam (MOBIC) 15 MG tablet TAKE 1 TABLET BY MOUTH EVERY DAY AS NEEDED FOR PAIN . AVOID ALL OTHER NSAIDS WHILE TAKING MELOXICAM 30 tablet 2    pantoprazole (PROTONIX) 40 MG tablet TAKE 1 TABLET BY MOUTH EVERY DAY 90 tablet 4    SITagliptin (JANUVIA) 100 MG Tab Take 1 tablet (100 mg total) by mouth once daily. 90 tablet 3    vitamin D (VITAMIN D3) 1000 units Tab Take 1,000 Units by mouth 2 (two) times a day.       No current facility-administered medications for this visit.       Review of patient's allergies indicates:  No Known Allergies    Past Medical History:   Diagnosis Date    Diabetes mellitus type I     HTN (hypertension)     Hyperlipidemia        Past Surgical History:   Procedure Laterality Date    CHOLECYSTECTOMY      HERNIA REPAIR      ROTATOR CUFF REPAIR         Family History   Problem Relation Age of Onset    Diabetes Mother     Diabetes Father     Prostate cancer Father     Diabetes Sister     Diabetes Brother     Diabetes Sister     Depression Sister     No Known Problems Sister        Social History     Socioeconomic History    Marital status:      Spouse name: andrei    Number of children: 3    Years of education: 16    Highest education level: Bachelor's degree (e.g., BA, AB, BS)   Tobacco Use    Smoking status: Never Smoker    Smokeless tobacco: Never Used   Substance and Sexual Activity    Alcohol use: Yes     Comment: occasional liquor    Drug use: Never    Sexual activity: Yes       There were no vitals filed for this visit.    Physical Exam  Constitutional:       General: He is not in  acute distress.  HENT:      Head: Normocephalic.   Cardiovascular:      Rate and Rhythm: Normal rate and regular rhythm.      Pulses: Normal pulses.   Pulmonary:      Effort: Pulmonary effort is normal. No respiratory distress.      Breath sounds: Normal breath sounds.   Abdominal:      General: Abdomen is flat. There is no distension.      Palpations: Abdomen is soft.      Tenderness: There is no abdominal tenderness.   Musculoskeletal:         General: Normal range of motion.   Skin:     General: Skin is warm.      Findings: Lesion (Posterior neck has 2 areas of 1.5 cm dark discoloration with a knot.  Scalp has about a 2 cm old incision with some tenderness in the area.  Abdominal area has 2 areas also the same some knot in tenderness consistent with recurrent cysts.) present.   Neurological:      General: No focal deficit present.      Mental Status: He is oriented to person, place, and time.         Assessment & Plan:    Encounter for other preprocedural examination  -     CBC Auto Differential; Future; Expected date: 08/22/2022  -     Basic Metabolic Panel; Future; Expected date: 08/22/2022    Inflamed seborrheic keratosis  -     Ambulatory referral/consult to General Surgery         patient go to the OR for excision of all 5 of these lesions since there was a re-excision and 1 of scalp will do in the OR as for controlled setting as will be taking more of it out then the 1st time.  Risks and benefits explained to the patient clear risk of bleeding, infection, recurrence, need for additional operations.  All questions were answered

## 2022-08-24 ENCOUNTER — TELEPHONE (OUTPATIENT)
Dept: PRIMARY CARE CLINIC | Facility: CLINIC | Age: 47
End: 2022-08-24
Payer: COMMERCIAL

## 2022-08-24 NOTE — TELEPHONE ENCOUNTER
----- Message from Ankit Lopes III, DO sent at 8/23/2022  7:49 AM CDT -----  Labs all good please let know thx

## 2022-08-25 ENCOUNTER — OUTSIDE PLACE OF SERVICE (OUTPATIENT)
Dept: SURGERY | Facility: CLINIC | Age: 47
End: 2022-08-25
Payer: COMMERCIAL

## 2022-08-25 ENCOUNTER — LAB REQUISITION (OUTPATIENT)
Dept: LAB | Facility: HOSPITAL | Age: 47
End: 2022-08-25
Attending: SURGERY
Payer: COMMERCIAL

## 2022-08-25 DIAGNOSIS — D48.5 NEOPLASM OF UNCERTAIN BEHAVIOR OF SKIN: ICD-10-CM

## 2022-08-25 DIAGNOSIS — D23.4 OTHER BENIGN NEOPLASM OF SKIN OF SCALP AND NECK: ICD-10-CM

## 2022-08-25 PROCEDURE — 11423 EXC H-F-NK-SP B9+MARG 2.1-3: CPT | Mod: 51,S$PBB,, | Performed by: SURGERY

## 2022-08-25 PROCEDURE — 11403 EXC TR-EXT B9+MARG 2.1-3CM: CPT | Mod: 51,S$PBB,, | Performed by: SURGERY

## 2022-08-25 PROCEDURE — 11404 EXC TR-EXT B9+MARG 3.1-4 CM: CPT | Mod: S$PBB,,, | Performed by: SURGERY

## 2022-08-25 PROCEDURE — 88305 SURGICAL PATHOLOGY: ICD-10-PCS | Mod: 26,,, | Performed by: PATHOLOGY

## 2022-08-25 PROCEDURE — 11404 PR EXC SKIN BENIG 3.1-4 CM TRUNK,ARM,LEG: ICD-10-PCS | Mod: S$PBB,,, | Performed by: SURGERY

## 2022-08-25 PROCEDURE — 11422 EXC H-F-NK-SP B9+MARG 1.1-2: CPT | Mod: 51,S$PBB,, | Performed by: SURGERY

## 2022-08-25 PROCEDURE — 11423 PR EXC SKIN BENIG 2.1-3 CM REMAINDR BODY: ICD-10-PCS | Mod: 51,S$PBB,, | Performed by: SURGERY

## 2022-08-25 PROCEDURE — 11422 PR EXC SKIN BENIG 1.1-2 CM REMAINDR BODY: ICD-10-PCS | Mod: 51,S$PBB,, | Performed by: SURGERY

## 2022-08-25 PROCEDURE — 11403 PR EXC SKIN BENIG 2.1-3 CM TRUNK,ARM,LEG: ICD-10-PCS | Mod: 51,S$PBB,, | Performed by: SURGERY

## 2022-08-25 PROCEDURE — 88305 TISSUE EXAM BY PATHOLOGIST: CPT | Mod: 26,,, | Performed by: PATHOLOGY

## 2022-08-25 PROCEDURE — 88305 TISSUE EXAM BY PATHOLOGIST: CPT | Mod: SUR | Performed by: SURGERY

## 2022-08-26 LAB
ESTROGEN SERPL-MCNC: NORMAL PG/ML
INSULIN SERPL-ACNC: NORMAL U[IU]/ML
LAB AP GROSS DESCRIPTION: NORMAL
LAB AP LABORATORY NOTES: NORMAL
T3RU NFR SERPL: NORMAL %

## 2022-09-08 ENCOUNTER — OFFICE VISIT (OUTPATIENT)
Dept: SURGERY | Facility: CLINIC | Age: 47
End: 2022-09-08
Payer: COMMERCIAL

## 2022-09-08 DIAGNOSIS — T81.72XS: Primary | ICD-10-CM

## 2022-09-08 DIAGNOSIS — I82.409: Primary | ICD-10-CM

## 2022-09-08 PROCEDURE — 99212 PR OFFICE/OUTPT VISIT, EST, LEVL II, 10-19 MIN: ICD-10-PCS | Mod: S$PBB,,, | Performed by: NURSE PRACTITIONER

## 2022-09-08 PROCEDURE — 3066F PR DOCUMENTATION OF TREATMENT FOR NEPHROPATHY: ICD-10-PCS | Mod: ,,, | Performed by: NURSE PRACTITIONER

## 2022-09-08 PROCEDURE — 1160F RVW MEDS BY RX/DR IN RCRD: CPT | Mod: ,,, | Performed by: NURSE PRACTITIONER

## 2022-09-08 PROCEDURE — 1159F MED LIST DOCD IN RCRD: CPT | Mod: ,,, | Performed by: NURSE PRACTITIONER

## 2022-09-08 PROCEDURE — 99212 OFFICE O/P EST SF 10 MIN: CPT | Mod: S$PBB,,, | Performed by: NURSE PRACTITIONER

## 2022-09-08 PROCEDURE — 3044F HG A1C LEVEL LT 7.0%: CPT | Mod: ,,, | Performed by: NURSE PRACTITIONER

## 2022-09-08 PROCEDURE — 1160F PR REVIEW ALL MEDS BY PRESCRIBER/CLIN PHARMACIST DOCUMENTED: ICD-10-PCS | Mod: ,,, | Performed by: NURSE PRACTITIONER

## 2022-09-08 PROCEDURE — 1159F PR MEDICATION LIST DOCUMENTED IN MEDICAL RECORD: ICD-10-PCS | Mod: ,,, | Performed by: NURSE PRACTITIONER

## 2022-09-08 PROCEDURE — 3044F PR MOST RECENT HEMOGLOBIN A1C LEVEL <7.0%: ICD-10-PCS | Mod: ,,, | Performed by: NURSE PRACTITIONER

## 2022-09-08 PROCEDURE — 99213 OFFICE O/P EST LOW 20 MIN: CPT | Mod: PBBFAC | Performed by: NURSE PRACTITIONER

## 2022-09-08 PROCEDURE — 3066F NEPHROPATHY DOC TX: CPT | Mod: ,,, | Performed by: NURSE PRACTITIONER

## 2022-09-08 PROCEDURE — 3061F NEG MICROALBUMINURIA REV: CPT | Mod: ,,, | Performed by: NURSE PRACTITIONER

## 2022-09-08 PROCEDURE — 3061F PR NEG MICROALBUMINURIA RESULT DOCUMENTED/REVIEW: ICD-10-PCS | Mod: ,,, | Performed by: NURSE PRACTITIONER

## 2022-09-10 ENCOUNTER — HOSPITAL ENCOUNTER (EMERGENCY)
Facility: HOSPITAL | Age: 47
Discharge: HOME | End: 2022-09-10
Attending: EMERGENCY MEDICINE
Payer: COMMERCIAL

## 2022-09-10 VITALS
WEIGHT: 210 LBS | SYSTOLIC BLOOD PRESSURE: 118 MMHG | RESPIRATION RATE: 18 BRPM | HEART RATE: 103 BPM | TEMPERATURE: 98 F | BODY MASS INDEX: 31.1 KG/M2 | DIASTOLIC BLOOD PRESSURE: 79 MMHG | HEIGHT: 69 IN | OXYGEN SATURATION: 98 %

## 2022-09-10 DIAGNOSIS — T81.30XA WOUND DEHISCENCE: Primary | ICD-10-CM

## 2022-09-10 PROCEDURE — 99282 PR EMERGENCY DEPT VISIT,LEVEL II: ICD-10-PCS | Mod: ,,, | Performed by: EMERGENCY MEDICINE

## 2022-09-10 PROCEDURE — 99282 EMERGENCY DEPT VISIT SF MDM: CPT | Mod: ,,, | Performed by: EMERGENCY MEDICINE

## 2022-09-10 PROCEDURE — 99281 EMR DPT VST MAYX REQ PHY/QHP: CPT

## 2022-09-10 NOTE — ED TRIAGE NOTES
Pt states that he had 2 lesions removed hosea 2 weeks ago by Dr. Velasco. States that he had the sutures removed on Thursday. States that yesterday & then today both areas have opened back up.

## 2022-09-10 NOTE — ED PROVIDER NOTES
Encounter Date: 9/10/2022    SCRIBE #1 NOTE: I, Yolanda Preston, am scribing for, and in the presence of,  Abhi Cavanaugh MD. I have scribed the entire note.     History     Chief Complaint   Patient presents with    Wound Dehiscence     The patient is a 47 y.o. male that presents to the emergency department due to a wound. Patient saw Dr. Velasco and had stitches placed in the left side of his neck and the middle of his neck. Patient is getting a dressing for both wounds. No symptoms were reported.     The history is provided by the patient. No  was used.   Review of patient's allergies indicates:  No Known Allergies  Past Medical History:   Diagnosis Date    Diabetes mellitus     HTN (hypertension)     Hyperlipidemia      Past Surgical History:   Procedure Laterality Date    CHOLECYSTECTOMY      HERNIA REPAIR      ROTATOR CUFF REPAIR       Family History   Problem Relation Age of Onset    Diabetes Mother     Diabetes Father     Prostate cancer Father     Diabetes Sister     Diabetes Brother     Diabetes Sister     Depression Sister     No Known Problems Sister      Social History     Tobacco Use    Smoking status: Never    Smokeless tobacco: Never   Substance Use Topics    Alcohol use: Yes     Comment: occasional liquor    Drug use: Never     Review of Systems   Constitutional: Negative.    HENT: Negative.     Eyes: Negative.    Respiratory: Negative.     Cardiovascular: Negative.    Gastrointestinal: Negative.    Endocrine: Negative.    Genitourinary: Negative.    Musculoskeletal: Negative.    Skin:  Positive for wound.   Allergic/Immunologic: Negative.    Neurological: Negative.    Hematological: Negative.    Psychiatric/Behavioral: Negative.     All other systems reviewed and are negative.    Physical Exam     Initial Vitals [09/10/22 1829]   BP Pulse Resp Temp SpO2   118/79 103 18 98.2 °F (36.8 °C) 98 %      MAP       --         Physical Exam    Constitutional: He appears well-developed and  well-nourished.   HENT:   Head: Normocephalic and atraumatic.   Right Ear: External ear normal.   Left Ear: External ear normal.   Nose: Nose normal.   Mouth/Throat: Oropharynx is clear and moist.   Eyes: Conjunctivae and EOM are normal. Pupils are equal, round, and reactive to light.   Neck: Neck supple.   Patient has 1cm laceration on the left side and the middle    Normal range of motion.  Pulmonary/Chest: Breath sounds normal.   Musculoskeletal:         General: Normal range of motion.      Cervical back: Normal range of motion and neck supple.     Neurological: He is alert and oriented to person, place, and time. He has normal strength and normal reflexes.       ED Course   Procedures  Labs Reviewed - No data to display       Imaging Results    None          Medications - No data to display             Attending Attestation:           Physician Attestation for Scribe:  Physician Attestation Statement for Scribe #1: I, Abhi Cavanaugh MD, reviewed documentation, as scribed by Yolanda Preston in my presence, and it is both accurate and complete.                    Clinical Impression:   Final diagnoses:  [T81.30XA] Wound dehiscence (Primary)      ED Disposition Condition    Discharge Stable          ED Prescriptions    None       Follow-up Information       Follow up With Specialties Details Why Contact Info    Ankit Lopes III, DO Family Medicine In 3 days For wound re-check [address]  [facility]  [facility] [id number]  [phone]               Abhi Cavanaugh MD  09/11/22 0451       Abhi Cavanaugh MD  09/11/22 045

## 2022-09-12 ENCOUNTER — OFFICE VISIT (OUTPATIENT)
Dept: SURGERY | Facility: CLINIC | Age: 47
End: 2022-09-12
Attending: SURGERY
Payer: COMMERCIAL

## 2022-09-12 DIAGNOSIS — Z09 FOLLOW-UP EXAMINATION, FOLLOWING OTHER SURGERY: Primary | ICD-10-CM

## 2022-09-12 PROCEDURE — 3061F PR NEG MICROALBUMINURIA RESULT DOCUMENTED/REVIEW: ICD-10-PCS | Mod: ,,, | Performed by: SURGERY

## 2022-09-12 PROCEDURE — 1159F PR MEDICATION LIST DOCUMENTED IN MEDICAL RECORD: ICD-10-PCS | Mod: ,,, | Performed by: SURGERY

## 2022-09-12 PROCEDURE — 99213 OFFICE O/P EST LOW 20 MIN: CPT | Mod: PBBFAC | Performed by: SURGERY

## 2022-09-12 PROCEDURE — 3061F NEG MICROALBUMINURIA REV: CPT | Mod: ,,, | Performed by: SURGERY

## 2022-09-12 PROCEDURE — 3066F NEPHROPATHY DOC TX: CPT | Mod: ,,, | Performed by: SURGERY

## 2022-09-12 PROCEDURE — 1159F MED LIST DOCD IN RCRD: CPT | Mod: ,,, | Performed by: SURGERY

## 2022-09-12 PROCEDURE — 99024 POSTOP FOLLOW-UP VISIT: CPT | Mod: ,,, | Performed by: SURGERY

## 2022-09-12 PROCEDURE — 3066F PR DOCUMENTATION OF TREATMENT FOR NEPHROPATHY: ICD-10-PCS | Mod: ,,, | Performed by: SURGERY

## 2022-09-12 PROCEDURE — 3044F HG A1C LEVEL LT 7.0%: CPT | Mod: ,,, | Performed by: SURGERY

## 2022-09-12 PROCEDURE — 99024 PR POST-OP FOLLOW-UP VISIT: ICD-10-PCS | Mod: ,,, | Performed by: SURGERY

## 2022-09-12 PROCEDURE — 3044F PR MOST RECENT HEMOGLOBIN A1C LEVEL <7.0%: ICD-10-PCS | Mod: ,,, | Performed by: SURGERY

## 2022-09-12 NOTE — PROGRESS NOTES
Post-operative Note    HPI:  The patient is status post excision of scalp lesions x3.  Head stitches removed last week with the open up over the weekend.    PHYSICAL EXAM:    Base of the scalp incisions are opened up.  The 1 on the left has about a 1 x 1 cm opening in the 1 on the right has about of 0.5 mm opening both about 0.5 mm deep.  The 1 on the right occipital area has scabbed over line.    Recent Results (from the past 504 hour(s))   Comprehensive Metabolic Panel    Collection Time: 08/22/22  2:01 PM   Result Value Ref Range    Sodium 144 136 - 145 mmol/L    Potassium 4.3 3.5 - 5.1 mmol/L    Chloride 110 (H) 98 - 107 mmol/L    CO2 28 21 - 32 mmol/L    Anion Gap 10 7 - 16 mmol/L    Glucose 94 74 - 106 mg/dL    BUN 14 7 - 18 mg/dL    Creatinine 1.19 0.70 - 1.30 mg/dL    BUN/Creatinine Ratio 12 6 - 20    Calcium 9.0 8.5 - 10.1 mg/dL    Total Protein 6.9 6.4 - 8.2 g/dL    Albumin 3.6 3.5 - 5.0 g/dL    Globulin 3.3 2.0 - 4.0 g/dL    A/G Ratio 1.1     Bilirubin, Total 0.6 0.0 - 1.2 mg/dL    Alk Phos 75 45 - 115 U/L    ALT 26 16 - 61 U/L    AST 15 15 - 37 U/L    eGFR 75 >=60 mL/min/1.73m²   Lipid Panel    Collection Time: 08/22/22  2:01 PM   Result Value Ref Range    Triglycerides 56 35 - 150 mg/dL    Cholesterol 120 0 - 200 mg/dL    HDL Cholesterol 45 40 - 60 mg/dL    Cholesterol/HDL Ratio (Risk Factor) 2.7     Non-HDL 75 mg/dL    LDL Calculated 64 mg/dL    LDL/HDL 1.4     VLDL 11 mg/dL   TSH    Collection Time: 08/22/22  2:01 PM   Result Value Ref Range    TSH 0.460 0.358 - 3.740 uIU/mL   Urinalysis    Collection Time: 08/22/22  2:01 PM   Result Value Ref Range    Color, UA Light-Yellow Colorless, Straw, Yellow, Light Yellow, Dark Yellow    Clarity, UA Clear Clear    pH, UA 6.5 5.0 to 8.0 pH Units    Leukocytes, UA Negative Negative    Nitrites, UA Negative Negative    Protein, UA Negative Negative    Glucose, UA >1000 Normal  mg/dL    Ketones, UA Negative Negative mg/dL    Urobilinogen, UA 2 (A) 0.2, 1.0, Normal mg/dL    Bilirubin, UA Negative Negative    Blood, UA Negative Negative    Specific Gravity, UA 1.036 (H) <=1.030   PSA, Screening    Collection Time: 08/22/22  2:01 PM   Result Value Ref Range    PSA Total 0.508 0.000 - 2.000 ng/mL   Hemoglobin A1C    Collection Time: 08/22/22  2:01 PM   Result Value Ref Range    Hemoglobin A1C 6.2 4.5 - 6.6 %    Estimated Average Glucose 120 mg/dL   CBC with Differential    Collection Time: 08/22/22  2:01 PM   Result Value Ref Range    WBC 6.14 4.50 - 11.00 K/uL    RBC 4.85 4.60 - 6.20 M/uL    Hemoglobin 14.8 13.5 - 18.0 g/dL    Hematocrit 43.3 40.0 - 54.0 %    MCV 89.3 80.0 - 96.0 fL    MCH 30.5 27.0 - 31.0 pg    MCHC 34.2 32.0 - 36.0 g/dL    RDW 15.0 (H) 11.5 - 14.5 %    Platelet Count 259 150 - 400 K/uL    MPV 11.3 9.4 - 12.4 fL    Neutrophils % 56.0 53.0 - 65.0 %    Lymphocytes % 28.2 27.0 - 41.0 %    Monocytes % 12.2 (H) 2.0 - 6.0 %    Eosinophils % 2.8 1.0 - 4.0 %    Basophils % 0.5 0.0 - 1.0 %    Immature Granulocytes % 0.3 0.0 - 0.4 %    nRBC, Auto 0.0 <=0.0 %    Neutrophils, Abs 3.44 1.80 - 7.70 K/uL    Lymphocytes, Absolute 1.73 1.00 - 4.80 K/uL    Monocytes, Absolute 0.75 0.00 - 0.80 K/uL    Eosinophils, Absolute 0.17 0.00 - 0.50 K/uL    Basophils, Absolute 0.03 0.00 - 0.20 K/uL    Immature Granulocytes, Absolute 0.02 0.00 - 0.04 K/uL    nRBC, Absolute 0.00 <=0.00 x10e3/uL    Diff Type Auto    Surgical Pathology    Collection Time: 08/25/22  9:47 AM   Result Value Ref Range    Case Report       Surgical Pathology                                Case: [id number]                                   Authorizing Provider:  Lorenzo Velasco MD        Collected:           08/25/2022 09:47 AM          Ordering Location:     [facility] -     Received:            08/25/2022 01:31 PM                                 Laboratory                                                                    Pathologist:           Wellington Beal MD                                                       Specimens:   A) - Skin, Scalp Lesion                                                                             B) - Skin, Right Posterior Neck Lesion                                                              C) - Skin, Left Posterior Neck Lesion                                                               D) - Skin, Lower Abd Lesion                                                                         E) - Skin, Upper Abd Lesion                                                                Final Diagnosis       A. 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 Skin, right posterior neck lesion, excision:  - Verrucous keratosis    C. Skin, left posterior neck lesion, excision:  - Verrucous keratosis with underlying dermal scar and embedded hair fragments      D. Skin, lower abdomen lesion, excision:    - Verrucous keratosis    E. Skin, upper abdomen lesion, excision:  - Verrucous keratosis        Gross Description       A. Skin, Scalp Lesion:   The specimen is received in formalin designated scalp lesion and consists of a brown-gray skin ellipse measuring 2.7 x 1.0 cm, excised to a depth of 0.8 cm.  The skin surface is disrupted near one of the tips and has a smooth, firm, somewhat linear lesion measuring 1.0 cm in length.  The margins are marked with black ink.  The specimen is sectioned with the tips submitted in cassette A1 and the remainder of the specimen in cassettes A2 and A3.    Grossing was completed by [employer].  B. Skin, Right Posterior Neck Lesion:   The specimen is received in formalin designated on the requisition as  Rt. post. neck lesion and consists of a brown skin ellipse measuring 1.5 x 1.0 cm, excised to a depth of 1.2 cm.  The skin surface is firm and wrinkled.  Centrally, there is a light tan  discoloration measuring 0.4 cm in greatest dimension adjacent to hemorrhagic material.  The margins are marked with black ink.  The specimen is sectioned with the tips submitted in cassette B1 and the remainder of the specimen in cassette B2.    Grossing was completed by [employer].  C. Skin, Left Posterior Neck Lesion:   The specimen is received in formalin designated on the requisition as left post. neck lesion and consists of a brown skin ellipse measuring 1.4 x 0.9 cm, excised to a depth of 0.7 cm.  The skin surface is firm and wrinkled.  The margins are marked with black ink.  The specimen is sectioned with the tips submitted in cassette C1 and the remainder of the specimen in cassette C2.    Grossing was completed by [employer].  D. Skin, Lower Abd Lesion:   The specimen is received in formalin designated on the requisition as lower abd lesion and consists of a brown skin ellipse measuring 2.3 x 1.4 cm, excised to a depth of 0.8 cm.  On the center of the skin surface is a raised, smooth, tan discolored area measuring 0.8 x 0.4 cm.  The margins are marked with black ink.  The specimen is sectioned with the tips submitted in cassette D1 and the remainder of the specimen in cassettes D2 and D3.    Grossing was completed by [employer].  E. Skin, Upper Abd Lesion:   The specimen is received in formalin designated on the requisition as upper abd lesion and consists of a brown-gray skin ellipse measuring 2.1 x 1.3 cm, excised to a depth of 0.5 cm.  The skin surface is mostly wrinkled.  Centrally, there is a raised, smooth, gray area measuring 1.0 cm in greatest dimension.  The margins are marked with black ink.  The specimen is sectioned with the tips submitted in cassette E1 and the remainder of the specimen in cassettes E2 and E3.    Grossing was completed by [employer].      Microscopic Description       A microscopic examination was performed and the diagnosis reflects the  findings.            Laboratory Notes       If this report includes immunohistochemical (IHC) test results, please note the following: IHC studies were interpreted in conjunction with appropriate positive and negative controls which demonstrate the expected positive and negative reactivity. This laboratory is regulated under CLIA as qualified to perform high-complexity testing. IHC tests are used for clinical purposes. They should not be regarded as investigational or research.              ASSESSMENT:      The patient is doing well after surgery.  Had mild dehiscence of the wound       PLAN:      Follow up PRN.    Patient advised to come back and see me in a month if not healed up which is placed little dressing on this should scab up and heal up over time.  Told to avoid iodine which she had been placing on there.

## 2022-09-12 NOTE — PROGRESS NOTES
Mike Mims is a 47 y.o. male patient.   No diagnosis found.  Past Medical History:   Diagnosis Date    Diabetes mellitus     HTN (hypertension)     Hyperlipidemia      No past surgical history pertinent negatives on file.  Scheduled Meds:  Continuous Infusions:  PRN Meds:    Review of patient's allergies indicates:  No Known Allergies  There are no hospital problems to display for this patient.    There were no vitals taken for this visit.    Subjective doing well  Objective  incisions healing well   Assessment & Plan     A. 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 Skin, right posterior neck lesion, excision:  - Verrucous keratosis     C. Skin, left posterior neck lesion, excision:  - Verrucous keratosis with underlying dermal scar and embedded hair fragments       D. Skin, lower abdomen lesion, excision:    - Verrucous keratosis     E. Skin, upper abdomen lesion, excision:  - Verrucous keratosis      2 week follow up skin lesions removal per dr Velasco  No signs of infection healing well  DC sutures  Educated on path report  Follow up prn  SABRINA Willis  9/12/2022

## 2022-09-15 DIAGNOSIS — Z12.11 COLON CANCER SCREENING: Primary | ICD-10-CM

## 2022-09-28 ENCOUNTER — HOSPITAL ENCOUNTER (OUTPATIENT)
Dept: GASTROENTEROLOGY | Facility: HOSPITAL | Age: 47
Discharge: HOME | End: 2022-09-28
Attending: STUDENT IN AN ORGANIZED HEALTH CARE EDUCATION/TRAINING PROGRAM
Payer: COMMERCIAL

## 2022-09-28 ENCOUNTER — ANESTHESIA (OUTPATIENT)
Dept: GASTROENTEROLOGY | Facility: HOSPITAL | Age: 47
End: 2022-09-28
Payer: COMMERCIAL

## 2022-09-28 ENCOUNTER — ANESTHESIA EVENT (OUTPATIENT)
Dept: GASTROENTEROLOGY | Facility: HOSPITAL | Age: 47
End: 2022-09-28
Payer: COMMERCIAL

## 2022-09-28 VITALS
DIASTOLIC BLOOD PRESSURE: 84 MMHG | RESPIRATION RATE: 15 BRPM | TEMPERATURE: 97 F | SYSTOLIC BLOOD PRESSURE: 120 MMHG | OXYGEN SATURATION: 97 % | HEART RATE: 77 BPM

## 2022-09-28 DIAGNOSIS — Z12.11 COLON CANCER SCREENING: ICD-10-CM

## 2022-09-28 LAB
GLUCOSE SERPL-MCNC: 120 MG/DL (ref 70–105)
GLUCOSE SERPL-MCNC: 120 MG/DL (ref 70–110)

## 2022-09-28 PROCEDURE — 88305 SURGICAL PATHOLOGY: ICD-10-PCS | Mod: 26,,, | Performed by: PATHOLOGY

## 2022-09-28 PROCEDURE — 37000008 HC ANESTHESIA 1ST 15 MINUTES

## 2022-09-28 PROCEDURE — 25000003 PHARM REV CODE 250: Performed by: NURSE ANESTHETIST, CERTIFIED REGISTERED

## 2022-09-28 PROCEDURE — D9220A PRA ANESTHESIA: Mod: ,,, | Performed by: NURSE ANESTHETIST, CERTIFIED REGISTERED

## 2022-09-28 PROCEDURE — 37000009 HC ANESTHESIA EA ADD 15 MINS

## 2022-09-28 PROCEDURE — 45385 COLONOSCOPY W/LESION REMOVAL: CPT

## 2022-09-28 PROCEDURE — 45385 PR COLONOSCOPY,REMV LESN,SNARE: ICD-10-PCS | Mod: ,,, | Performed by: STUDENT IN AN ORGANIZED HEALTH CARE EDUCATION/TRAINING PROGRAM

## 2022-09-28 PROCEDURE — 88305 TISSUE EXAM BY PATHOLOGIST: CPT | Mod: 26,,, | Performed by: PATHOLOGY

## 2022-09-28 PROCEDURE — 27000284 HC CANNULA NASAL: Performed by: NURSE ANESTHETIST, CERTIFIED REGISTERED

## 2022-09-28 PROCEDURE — D9220A PRA ANESTHESIA: ICD-10-PCS | Mod: ,,, | Performed by: NURSE ANESTHETIST, CERTIFIED REGISTERED

## 2022-09-28 PROCEDURE — 88305 TISSUE EXAM BY PATHOLOGIST: CPT | Mod: SUR | Performed by: STUDENT IN AN ORGANIZED HEALTH CARE EDUCATION/TRAINING PROGRAM

## 2022-09-28 PROCEDURE — 63600175 PHARM REV CODE 636 W HCPCS: Performed by: NURSE ANESTHETIST, CERTIFIED REGISTERED

## 2022-09-28 PROCEDURE — 45385 COLONOSCOPY W/LESION REMOVAL: CPT | Mod: ,,, | Performed by: STUDENT IN AN ORGANIZED HEALTH CARE EDUCATION/TRAINING PROGRAM

## 2022-09-28 PROCEDURE — 82962 GLUCOSE BLOOD TEST: CPT

## 2022-09-28 RX ORDER — ATORVASTATIN CALCIUM 80 MG/1
TABLET, FILM COATED ORAL
Qty: 30 TABLET | Refills: 11 | Status: SHIPPED | OUTPATIENT
Start: 2022-09-28 | End: 2023-10-02

## 2022-09-28 RX ORDER — LIDOCAINE HYDROCHLORIDE 20 MG/ML
INJECTION, SOLUTION EPIDURAL; INFILTRATION; INTRACAUDAL; PERINEURAL
Status: DISCONTINUED | OUTPATIENT
Start: 2022-09-28 | End: 2022-09-28

## 2022-09-28 RX ORDER — ONDANSETRON 2 MG/ML
4 INJECTION INTRAMUSCULAR; INTRAVENOUS ONCE AS NEEDED
Status: DISCONTINUED | OUTPATIENT
Start: 2022-09-28 | End: 2022-09-29 | Stop reason: HOSPADM

## 2022-09-28 RX ORDER — PROPOFOL 10 MG/ML
VIAL (ML) INTRAVENOUS
Status: DISCONTINUED | OUTPATIENT
Start: 2022-09-28 | End: 2022-09-28

## 2022-09-28 RX ORDER — SODIUM CHLORIDE 9 MG/ML
INJECTION, SOLUTION INTRAVENOUS CONTINUOUS
Status: DISCONTINUED | OUTPATIENT
Start: 2022-09-28 | End: 2022-09-29 | Stop reason: HOSPADM

## 2022-09-28 RX ORDER — SODIUM CHLORIDE 0.9 % (FLUSH) 0.9 %
10 SYRINGE (ML) INJECTION
Status: DISCONTINUED | OUTPATIENT
Start: 2022-09-28 | End: 2022-09-29 | Stop reason: HOSPADM

## 2022-09-28 RX ORDER — SODIUM CHLORIDE 9 MG/ML
INJECTION, SOLUTION INTRAVENOUS CONTINUOUS PRN
Status: DISCONTINUED | OUTPATIENT
Start: 2022-09-28 | End: 2022-09-28

## 2022-09-28 RX ORDER — PROCHLORPERAZINE EDISYLATE 5 MG/ML
5 INJECTION INTRAMUSCULAR; INTRAVENOUS ONCE AS NEEDED
Status: DISCONTINUED | OUTPATIENT
Start: 2022-09-28 | End: 2022-09-29 | Stop reason: HOSPADM

## 2022-09-28 RX ADMIN — PROPOFOL 50 MG: 10 INJECTION, EMULSION INTRAVENOUS at 08:09

## 2022-09-28 RX ADMIN — SODIUM CHLORIDE: 9 INJECTION, SOLUTION INTRAVENOUS at 07:09

## 2022-09-28 RX ADMIN — LIDOCAINE HYDROCHLORIDE 100 MG: 20 INJECTION, SOLUTION INTRAVENOUS at 07:09

## 2022-09-28 RX ADMIN — PROPOFOL 50 MG: 10 INJECTION, EMULSION INTRAVENOUS at 07:09

## 2022-09-28 NOTE — DISCHARGE INSTRUCTIONS
Procedure Date  9/28/22     Impression  Overall Impression: Fair prep. Large internal hemorrhoids which is likely cause of patient's rectal bleeding. One small polyp removed.     Recommendation    Await pathology results     Repeat colonoscopy in 5 years     NO DRIVING, OPERATING EQUIPMENT, OR SIGNING LEGAL DOCUMENTS FOR 24 HOURS.  THE NURSE WILL CALL YOU WITH YOUR BIOPSY RESULTS IN A FEW DAYS.

## 2022-09-28 NOTE — TRANSFER OF CARE
Anesthesia Transfer of Care Note    Patient: Mike Mims    Procedure(s) Performed: * No procedures listed *    Patient location: GI    Anesthesia Type: general    Transport from OR: Transported from OR on room air with adequate spontaneous ventilation    Post pain: adequate analgesia    Post assessment: no apparent anesthetic complications    Post vital signs: stable    Level of consciousness: responds to stimulation    Nausea/Vomiting: no nausea/vomiting    Complications: none    Transfer of care protocol was followed      Last vitals:   Visit Vitals  /67 (BP Location: Left arm, Patient Position: Lying)   Pulse 87   Temp 36.1 °C (97 °F) (Oral)   Resp 16   SpO2 100%

## 2022-09-28 NOTE — ANESTHESIA POSTPROCEDURE EVALUATION
Anesthesia Post Evaluation    Patient: Mike Mims    Procedure(s) Performed: * No procedures listed *    Final Anesthesia Type: general      Patient location during evaluation: GI PACU  Patient participation: Yes- Able to Participate  Level of consciousness: awake and alert  Post-procedure vital signs: reviewed and stable  Pain management: adequate  Airway patency: patent    PONV status at discharge: No PONV  Anesthetic complications: no      Cardiovascular status: blood pressure returned to baseline and hemodynamically stable  Respiratory status: spontaneous ventilation and unassisted  Hydration status: euvolemic  Follow-up not needed.          Vitals Value Taken Time   /67 09/28/22 0812   Temp 36.1 °C (97 °F) 09/28/22 0812   Pulse 87 09/28/22 0812   Resp 16 09/28/22 0812   SpO2 100 % 09/28/22 0812         No case tracking events are documented in the log.      Pain/Kiki Score: No data recorded

## 2022-09-28 NOTE — H&P
"History of Present Illness    Mike Mims is a 47 y.o. male that  has a past medical history of Diabetes mellitus, HTN (hypertension), and Hyperlipidemia.     On Narco, Celebrex, protonix, mobic, Januvia, ASA 81mg.    States last colonoscopy was 7 years ago for bleeding, attributed to internal hemorrhoids. States constipation with pain medications.    States he had a large bloody BM and saw PCP who scheduled him for colonoscopy.    Patient otherwise denies any:  - black stools  - nausea  - vomiting  - diarrhea  - constipation  - abdominal pain  - family history of GI related malignancies    ROS  - 12 point review of systems is negative except as otherwise stated in HPI.    Past Medical History:   Diagnosis Date    Diabetes mellitus     HTN (hypertension)     Hyperlipidemia        Past Surgical History:   Procedure Laterality Date    CHOLECYSTECTOMY      HERNIA REPAIR      ROTATOR CUFF REPAIR         Family History   Problem Relation Age of Onset    Diabetes Mother     Diabetes Father     Prostate cancer Father     Diabetes Sister     Diabetes Brother     Diabetes Sister     Depression Sister     No Known Problems Sister        Social History     Socioeconomic History    Marital status:      Spouse name: andrei    Number of children: 3    Years of education: 16    Highest education level: Bachelor's degree (e.g., BA, AB, BS)   Tobacco Use    Smoking status: Never    Smokeless tobacco: Never   Substance and Sexual Activity    Alcohol use: Yes     Comment: occasional liquor    Drug use: Never    Sexual activity: Yes       Current Outpatient Medications   Medication Sig Dispense Refill    ascorbic acid, vitamin C, (VITAMIN C) 500 MG tablet Take 500 mg by mouth once daily.      aspirin (ECOTRIN) 81 MG EC tablet Take 81 mg by mouth once daily.      atorvastatin (LIPITOR) 80 MG tablet TAKE 1 TABLET BY MOUTH EVERY DAY 90 tablet 3    BD VEO INSULIN SYRINGE UF 1 mL 31 gauge x 15/64" Syrg USE 3 TIMES A DAY TO INJECT " INSULIN 100 each 3    celecoxib (CELEBREX) 200 MG capsule Take 1 capsule (200 mg total) by mouth 2 (two) times daily. 60 capsule 5    dapagliflozin-metformin (XIGDUO XR) 5-1,000 mg TBph Take 1 tablet by mouth 2 (two) times a day. 180 tablet 3    HYDROcodone-acetaminophen (NORCO) 7.5-325 mg per tablet Take 1 tablet by mouth every 4 (four) hours as needed for Pain. (Patient not taking: Reported on 9/8/2022) 84 tablet 0    hydrocortisone 2.5 % cream Apply topically 2 (two) times daily. 28 g 12    insulin detemir U-100 (LEVEMIR) 100 unit/mL injection Inject 45 Units into the skin once daily. (Patient taking differently: Inject 45 Units into the skin once daily. Pt takes 40 - 50 units) 45 mL 3    lisinopriL (PRINIVIL,ZESTRIL) 5 MG tablet Take 5 mg by mouth once daily.      meloxicam (MOBIC) 15 MG tablet TAKE 1 TABLET BY MOUTH EVERY DAY AS NEEDED FOR PAIN . AVOID ALL OTHER NSAIDS WHILE TAKING MELOXICAM 30 tablet 2    pantoprazole (PROTONIX) 40 MG tablet TAKE 1 TABLET BY MOUTH EVERY DAY 90 tablet 4    SITagliptin (JANUVIA) 100 MG Tab Take 1 tablet (100 mg total) by mouth once daily. 90 tablet 3    vitamin D (VITAMIN D3) 1000 units Tab Take 1,000 Units by mouth 2 (two) times a day.       No current facility-administered medications for this encounter.       Review of patient's allergies indicates:  No Known Allergies    Objective:  There were no vitals filed for this visit.     Constitutional:       General: no acute distress.     Appearance: Normal appearance. Non toxic-appearing.   HENT:      Head: Normocephalic and atraumatic.      Mouth/Throat:      Pharynx: Oropharynx is clear. No posterior oropharyngeal erythema.   Eyes:      General: No scleral icterus.     Conjunctiva/sclera: Conjunctivae normal.   Cardiovascular:      Rate and Rhythm: Normal rate and regular rhythm.      Heart sounds: Normal heart sounds.   Pulmonary:      Effort: Pulmonary effort is normal.      Breath sounds: Normal breath sounds.   Abdominal:       General: Abdomen is flat. There is no distension.      Palpations: Abdomen is soft. There is no mass.      Tenderness: There is no abdominal tenderness. There is no guarding.   Musculoskeletal:         General: No swelling or deformity.      Cervical back: Normal range of motion and neck supple.   Skin:     General: Skin is warm and dry.   Neurological:      General: No focal deficit present.      Mental Status: alert and oriented to person, place, and time.     Assessment and Plan:  Proceed with:  Colonoscopy for rectal bleeding    Michele Mccarty MD  Gastroenterology

## 2022-09-28 NOTE — ANESTHESIA PREPROCEDURE EVALUATION
09/28/2022  Mike Mims is a 47 y.o., male.      Pre-op Assessment    I have reviewed the Patient Summary Reports.     I have reviewed the Nursing Notes. I have reviewed the NPO Status.   I have reviewed the Medications.     Review of Systems  Anesthesia Hx:  No problems with previous Anesthesia    Cardiovascular:   Hypertension    Endocrine:   Diabetes  Metabolic Disorders, Obesity / BMI > 30    Past Medical History:   Diagnosis Date    Diabetes mellitus     HTN (hypertension)     Hyperlipidemia      Past Surgical History:   Procedure Laterality Date    CHOLECYSTECTOMY      HERNIA REPAIR      ROTATOR CUFF REPAIR         Physical Exam  General: Well nourished, Cooperative, Alert and Oriented    Airway:  Mallampati: II       Chest/Lungs:  Clear to auscultation    Heart:  Rate: Normal        Anesthesia Plan  Type of Anesthesia, risks & benefits discussed:    Anesthesia Type: Gen Natural Airway, MAC  Intra-op Monitoring Plan: Standard ASA Monitors  Post Op Pain Control Plan: multimodal analgesia and IV/PO Opioids PRN  Induction:  IV  Informed Consent: Informed consent signed with the Patient and all parties understand the risks and agree with anesthesia plan.  All questions answered. Patient consented to blood products? Yes  ASA Score: 3  Day of Surgery Review of History & Physical: I have interviewed and examined the patient. I have reviewed the patient's H&P dated: There are no significant changes.     Ready For Surgery From Anesthesia Perspective.     .

## 2022-10-19 ENCOUNTER — OFFICE VISIT (OUTPATIENT)
Dept: DIABETES SERVICES | Facility: CLINIC | Age: 47
End: 2022-10-19
Payer: COMMERCIAL

## 2022-10-19 VITALS
BODY MASS INDEX: 32.08 KG/M2 | DIASTOLIC BLOOD PRESSURE: 66 MMHG | OXYGEN SATURATION: 99 % | WEIGHT: 216.63 LBS | HEART RATE: 76 BPM | SYSTOLIC BLOOD PRESSURE: 132 MMHG | HEIGHT: 69 IN | RESPIRATION RATE: 16 BRPM

## 2022-10-19 DIAGNOSIS — I10 PRIMARY HYPERTENSION: ICD-10-CM

## 2022-10-19 DIAGNOSIS — E11.9 TYPE 2 DIABETES MELLITUS WITHOUT COMPLICATION, WITH LONG-TERM CURRENT USE OF INSULIN: Primary | ICD-10-CM

## 2022-10-19 DIAGNOSIS — Z79.4 TYPE 2 DIABETES MELLITUS WITHOUT COMPLICATION, WITH LONG-TERM CURRENT USE OF INSULIN: Primary | ICD-10-CM

## 2022-10-19 DIAGNOSIS — E78.5 HYPERLIPIDEMIA, UNSPECIFIED HYPERLIPIDEMIA TYPE: ICD-10-CM

## 2022-10-19 LAB — GLUCOSE SERPL-MCNC: 87 MG/DL (ref 70–110)

## 2022-10-19 PROCEDURE — 3078F PR MOST RECENT DIASTOLIC BLOOD PRESSURE < 80 MM HG: ICD-10-PCS | Mod: ,,, | Performed by: NURSE PRACTITIONER

## 2022-10-19 PROCEDURE — 3061F NEG MICROALBUMINURIA REV: CPT | Mod: ,,, | Performed by: NURSE PRACTITIONER

## 2022-10-19 PROCEDURE — 99213 PR OFFICE/OUTPT VISIT, EST, LEVL III, 20-29 MIN: ICD-10-PCS | Mod: S$PBB,,, | Performed by: NURSE PRACTITIONER

## 2022-10-19 PROCEDURE — 3078F DIAST BP <80 MM HG: CPT | Mod: ,,, | Performed by: NURSE PRACTITIONER

## 2022-10-19 PROCEDURE — 82962 GLUCOSE BLOOD TEST: CPT | Mod: PBBFAC | Performed by: NURSE PRACTITIONER

## 2022-10-19 PROCEDURE — 3075F PR MOST RECENT SYSTOLIC BLOOD PRESS GE 130-139MM HG: ICD-10-PCS | Mod: ,,, | Performed by: NURSE PRACTITIONER

## 2022-10-19 PROCEDURE — 3061F PR NEG MICROALBUMINURIA RESULT DOCUMENTED/REVIEW: ICD-10-PCS | Mod: ,,, | Performed by: NURSE PRACTITIONER

## 2022-10-19 PROCEDURE — 1159F MED LIST DOCD IN RCRD: CPT | Mod: ,,, | Performed by: NURSE PRACTITIONER

## 2022-10-19 PROCEDURE — 99213 OFFICE O/P EST LOW 20 MIN: CPT | Mod: S$PBB,,, | Performed by: NURSE PRACTITIONER

## 2022-10-19 PROCEDURE — 3066F NEPHROPATHY DOC TX: CPT | Mod: ,,, | Performed by: NURSE PRACTITIONER

## 2022-10-19 PROCEDURE — 3066F PR DOCUMENTATION OF TREATMENT FOR NEPHROPATHY: ICD-10-PCS | Mod: ,,, | Performed by: NURSE PRACTITIONER

## 2022-10-19 PROCEDURE — 3044F HG A1C LEVEL LT 7.0%: CPT | Mod: ,,, | Performed by: NURSE PRACTITIONER

## 2022-10-19 PROCEDURE — 99214 OFFICE O/P EST MOD 30 MIN: CPT | Mod: PBBFAC | Performed by: NURSE PRACTITIONER

## 2022-10-19 PROCEDURE — 1159F PR MEDICATION LIST DOCUMENTED IN MEDICAL RECORD: ICD-10-PCS | Mod: ,,, | Performed by: NURSE PRACTITIONER

## 2022-10-19 PROCEDURE — 3075F SYST BP GE 130 - 139MM HG: CPT | Mod: ,,, | Performed by: NURSE PRACTITIONER

## 2022-10-19 PROCEDURE — 1160F RVW MEDS BY RX/DR IN RCRD: CPT | Mod: ,,, | Performed by: NURSE PRACTITIONER

## 2022-10-19 PROCEDURE — 3044F PR MOST RECENT HEMOGLOBIN A1C LEVEL <7.0%: ICD-10-PCS | Mod: ,,, | Performed by: NURSE PRACTITIONER

## 2022-10-19 PROCEDURE — 1160F PR REVIEW ALL MEDS BY PRESCRIBER/CLIN PHARMACIST DOCUMENTED: ICD-10-PCS | Mod: ,,, | Performed by: NURSE PRACTITIONER

## 2022-10-19 RX ORDER — DAPAGLIFLOZIN AND METFORMIN HYDROCHLORIDE 5; 1000 MG/1; MG/1
1 TABLET, FILM COATED, EXTENDED RELEASE ORAL 2 TIMES DAILY
Qty: 180 TABLET | Refills: 3 | Status: SHIPPED | OUTPATIENT
Start: 2022-10-19 | End: 2023-11-15 | Stop reason: SDUPTHER

## 2022-10-19 RX ORDER — SYRINGE AND NEEDLE,INSULIN,1ML 31GX15/64"
SYRINGE, EMPTY DISPOSABLE MISCELLANEOUS
Qty: 100 EACH | Refills: 3 | Status: SHIPPED | OUTPATIENT
Start: 2022-10-19 | End: 2024-02-12 | Stop reason: SDUPTHER

## 2022-10-19 NOTE — PATIENT INSTRUCTIONS
Pt is advised to monitor and document glucose fasting when you wake up before you eat and 2 hours after meal and bring in meter to next visit.      Ensure to take medications as directed.      Follow diabetic diet as directed.      Work to achieve normal body weight.     Ensure to exercise 4-5 times per week for 20 minutes.      Snacks with 0-5 grams carbs   Hard Boiled Egg   Crystal Light, Vitamin Water, Powerade Zero   Herbal tea, unsweetened   8 oz unsweetened almond milk   2 tsp peanut butter on celery   ½ cup sugar-free Jell-O   1 sugar-free popsicle   Non starchy vegetables such as carrots or celery sticks with lowfat dressing   ½ oz lowfat cheese or string cheese   1 closed handful of nuts or tbsp of seeds, unsalted    Snacks with 15 gram carbs  . 1 small piece of fruit or . banana or . cup light canned fruit  . 3 deion cracker squares  . 3 cups popcorn  . 5 Vanilla Wafers  . 1/2 cup low fat, no added sugar ice cream or frozen yogurt  . 1/2 turkey, ham, or chicken sandwich  . 1.2 cup fruit with 1/2 cup of cottage cheese  . 4-6 unsalted wheat crackers with 1 oz low fat cheese or 1 tbsp peanut butter  . 30 goldfish crackers  . 7-8 mini rice cakes  . 1/3 cup hummus dip with raw vegetables  . 1/2 whole wheat eladia, 1 tbsp hummus  . Mini pizza (. whole wheat English muffin, low-fat cheese, tomato sauce)  . 100 calorie snack pack  . 4-6 oz light yogurt  . 1/2 cup sugar-free pudding

## 2022-10-19 NOTE — PROGRESS NOTES
Subjective:       Patient ID: Mike Mims is a 47 y.o. male.    Chief Complaint: No chief complaint on file.    Here today for routine evaluation and med refill.  A1c remains well controlled since last visit. Labs done 8/22/22 reviewed with pt.     Hemoglobin A1C       Date                     Value               Ref Range           Status                08/22/2022               6.2                 4.5 - 6.6 %         Final                 07/18/2022               6.4                 4.5 - 6.6 %         Final                 01/18/2022               6.7 (A)             4.5 - 6.6 %         Final                 06/28/2021               6.2                 4.5 - 6.6 %         Final                 05/06/2021               5.9                 %                   Final            ----------  Lab Results       Component                Value               Date                       MICROALBUR               0.9                 07/18/2022              Lab Results       Component                Value               Date                       CHOL                     120                 08/22/2022                 CHOL                     118                 07/18/2022                 CHOL                     110                 08/26/2021            Lab Results       Component                Value               Date                       HDL                      45                  08/22/2022                 HDL                      45                  07/18/2022                 HDL                      37 (L)              08/26/2021            Lab Results       Component                Value               Date                       LDLCALC                  64                  08/22/2022                 LDLCALC                  62                  07/18/2022                 LDLCALC                  62                  08/26/2021            Lab Results       Component                Value               Date                        TRIG                     56                  08/22/2022                 TRIG                     56                  07/18/2022                 TRIG                     57                  08/26/2021            Lab Results       Component                Value               Date                       CHOLHDL                  2.7                 08/22/2022                 CHOLHDL                  2.6                 07/18/2022                 CHOLHDL                  3.0                 08/26/2021              CMP  Sodium       Date                     Value               Ref Range           Status                08/22/2022               144                 136 - 145 mmol*     Final            ----------  Potassium       Date                     Value               Ref Range           Status                08/22/2022               4.3                 3.5 - 5.1 mmol*     Final            ----------  Chloride       Date                     Value               Ref Range           Status                08/22/2022               110 (H)             98 - 107 mmol/L     Final            ----------  CO2       Date                     Value               Ref Range           Status                08/22/2022               28                  21 - 32 mmol/L      Final            ----------  Glucose       Date                     Value               Ref Range           Status                08/22/2022               94                  74 - 106 mg/dL      Final            ----------  BUN       Date                     Value               Ref Range           Status                08/22/2022               14                  7 - 18 mg/dL        Final            ----------  Creatinine       Date                     Value               Ref Range           Status                08/22/2022               1.19                0.70 - 1.30 mg*     Final            ----------  Calcium       Date                     Value               Ref  Range           Status                08/22/2022               9.0                 8.5 - 10.1 mg/*     Final            ----------  Total Protein       Date                     Value               Ref Range           Status                08/22/2022               6.9                 6.4 - 8.2 g/dL      Final            ----------  Albumin       Date                     Value               Ref Range           Status                08/22/2022               3.6                 3.5 - 5.0 g/dL      Final            ----------  Bilirubin, Total       Date                     Value               Ref Range           Status                08/22/2022               0.6                 0.0 - 1.2 mg/dL     Final            ----------  Alk Phos       Date                     Value               Ref Range           Status                08/22/2022               75                  45 - 115 U/L        Final            ----------  AST       Date                     Value               Ref Range           Status                08/22/2022               15                  15 - 37 U/L         Final            ----------  ALT       Date                     Value               Ref Range           Status                08/22/2022               26                  16 - 61 U/L         Final            ----------  Anion Gap       Date                     Value               Ref Range           Status                08/22/2022               10                  7 - 16 mmol/L       Final            ----------  eGFR        Date                     Value               Ref Range           Status                06/28/2021               84                  >=60 mL/min/1.*     Final            ----------  eGFR       Date                     Value               Ref Range           Status                07/18/2022               76                  >=60 mL/min/1.*     Final            ----------      Review of Systems   Constitutional:   Negative for activity change, appetite change, diaphoresis and fatigue.   HENT:  Negative for nasal congestion, facial swelling and sinus pressure/congestion.    Eyes:  Negative for visual disturbance.   Respiratory:  Negative for shortness of breath and wheezing.    Cardiovascular:  Negative for chest pain and leg swelling.   Gastrointestinal:  Negative for constipation, diarrhea, nausea and vomiting.   Endocrine: Negative for polydipsia, polyphagia and polyuria.   Genitourinary:  Negative for dysuria, frequency and urgency.   Musculoskeletal:  Negative for arthralgias, gait problem, joint swelling and myalgias.   Integumentary:  Negative for color change, rash and wound.   Neurological:  Negative for dizziness, syncope, weakness, headaches, coordination difficulties and coordination difficulties.   Hematological:  Does not bruise/bleed easily.   Psychiatric/Behavioral:  Negative for self-injury, sleep disturbance and suicidal ideas. The patient is not nervous/anxious.        Objective:      Physical Exam  Vitals and nursing note reviewed.   Constitutional:       General: He is not in acute distress.     Appearance: Normal appearance.   HENT:      Head: Normocephalic.      Nose: Nose normal. No congestion or rhinorrhea.      Mouth/Throat:      Mouth: Mucous membranes are moist.      Pharynx: Oropharynx is clear.   Eyes:      General:         Right eye: No discharge.         Left eye: No discharge.      Conjunctiva/sclera: Conjunctivae normal.   Neck:      Vascular: No carotid bruit.   Cardiovascular:      Rate and Rhythm: Normal rate and regular rhythm.      Pulses: Normal pulses.      Heart sounds: Normal heart sounds. No murmur heard.    No friction rub.   Pulmonary:      Effort: Pulmonary effort is normal. No respiratory distress.      Breath sounds: Normal breath sounds. No stridor. No wheezing, rhonchi or rales.   Abdominal:      General: Abdomen is flat. Bowel sounds are normal. There is no distension.       Palpations: Abdomen is soft.   Musculoskeletal:         General: No swelling. Normal range of motion.      Cervical back: Normal range of motion and neck supple.      Right foot: Normal range of motion. No deformity.      Left foot: Normal range of motion. No deformity.   Feet:      Right foot:      Skin integrity: Skin integrity normal. No ulcer or callus.      Toenail Condition: Right toenails are normal.      Left foot:      Skin integrity: Skin integrity normal. No ulcer or callus.      Toenail Condition: Left toenails are normal.   Lymphadenopathy:      Cervical: No cervical adenopathy.   Skin:     General: Skin is warm and dry.      Capillary Refill: Capillary refill takes less than 2 seconds.      Coloration: Skin is not cyanotic or mottled.      Findings: No lesion, rash or wound.   Neurological:      General: No focal deficit present.      Mental Status: He is alert and oriented to person, place, and time. Mental status is at baseline.   Psychiatric:         Mood and Affect: Mood normal.         Behavior: Behavior normal.       Assessment:       Problem List Items Addressed This Visit          Cardiac/Vascular    HTN (hypertension)    Hyperlipidemia       Endocrine    Type 2 diabetes mellitus, with long-term current use of insulin - Primary         Plan:       Problem List Items Addressed This Visit          Cardiac/Vascular    HTN (hypertension)    Hyperlipidemia       Endocrine    Type 2 diabetes mellitus, with long-term current use of insulin - Primary         No change in meds at this time.  Continue to monitor glucose bid and bring in meter to next visit.  Lifestyle modifications encouraged to include diet and exercise.

## 2022-11-15 ENCOUNTER — OFFICE VISIT (OUTPATIENT)
Dept: PRIMARY CARE CLINIC | Facility: CLINIC | Age: 47
End: 2022-11-15
Payer: COMMERCIAL

## 2022-11-15 VITALS
WEIGHT: 213 LBS | RESPIRATION RATE: 18 BRPM | DIASTOLIC BLOOD PRESSURE: 86 MMHG | BODY MASS INDEX: 31.55 KG/M2 | HEIGHT: 69 IN | OXYGEN SATURATION: 99 % | HEART RATE: 81 BPM | SYSTOLIC BLOOD PRESSURE: 130 MMHG

## 2022-11-15 DIAGNOSIS — Z23 NEED FOR VACCINATION: Primary | ICD-10-CM

## 2022-11-15 DIAGNOSIS — E11.9 TYPE 2 DIABETES MELLITUS WITHOUT COMPLICATION, WITHOUT LONG-TERM CURRENT USE OF INSULIN: ICD-10-CM

## 2022-11-15 DIAGNOSIS — I10 PRIMARY HYPERTENSION: ICD-10-CM

## 2022-11-15 DIAGNOSIS — Z13.220 SCREENING FOR LIPOID DISORDERS: Primary | ICD-10-CM

## 2022-11-15 DIAGNOSIS — E78.5 HYPERLIPIDEMIA, UNSPECIFIED HYPERLIPIDEMIA TYPE: ICD-10-CM

## 2022-11-15 DIAGNOSIS — Z00.00 ROUTINE GENERAL MEDICAL EXAMINATION AT A HEALTH CARE FACILITY: ICD-10-CM

## 2022-11-15 DIAGNOSIS — Z13.1 SCREENING FOR DIABETES MELLITUS: ICD-10-CM

## 2022-11-15 PROCEDURE — 3044F PR MOST RECENT HEMOGLOBIN A1C LEVEL <7.0%: ICD-10-PCS | Mod: ,,, | Performed by: FAMILY MEDICINE

## 2022-11-15 PROCEDURE — 90686 FLU VACCINE (QUAD) GREATER THAN OR EQUAL TO 3YO PRESERVATIVE FREE IM: ICD-10-PCS | Mod: ,,, | Performed by: FAMILY MEDICINE

## 2022-11-15 PROCEDURE — 1159F PR MEDICATION LIST DOCUMENTED IN MEDICAL RECORD: ICD-10-PCS | Mod: ,,, | Performed by: FAMILY MEDICINE

## 2022-11-15 PROCEDURE — 99396 PREV VISIT EST AGE 40-64: CPT | Mod: 25,,, | Performed by: FAMILY MEDICINE

## 2022-11-15 PROCEDURE — 3075F SYST BP GE 130 - 139MM HG: CPT | Mod: ,,, | Performed by: FAMILY MEDICINE

## 2022-11-15 PROCEDURE — 90471 FLU VACCINE (QUAD) GREATER THAN OR EQUAL TO 3YO PRESERVATIVE FREE IM: ICD-10-PCS | Mod: ,,, | Performed by: FAMILY MEDICINE

## 2022-11-15 PROCEDURE — 1159F MED LIST DOCD IN RCRD: CPT | Mod: ,,, | Performed by: FAMILY MEDICINE

## 2022-11-15 PROCEDURE — 90471 IMMUNIZATION ADMIN: CPT | Mod: ,,, | Performed by: FAMILY MEDICINE

## 2022-11-15 PROCEDURE — 3061F PR NEG MICROALBUMINURIA RESULT DOCUMENTED/REVIEW: ICD-10-PCS | Mod: ,,, | Performed by: FAMILY MEDICINE

## 2022-11-15 PROCEDURE — 90686 IIV4 VACC NO PRSV 0.5 ML IM: CPT | Mod: ,,, | Performed by: FAMILY MEDICINE

## 2022-11-15 PROCEDURE — 99396 PR PREVENTIVE VISIT,EST,40-64: ICD-10-PCS | Mod: 25,,, | Performed by: FAMILY MEDICINE

## 2022-11-15 PROCEDURE — 3066F NEPHROPATHY DOC TX: CPT | Mod: ,,, | Performed by: FAMILY MEDICINE

## 2022-11-15 PROCEDURE — 3079F DIAST BP 80-89 MM HG: CPT | Mod: ,,, | Performed by: FAMILY MEDICINE

## 2022-11-15 PROCEDURE — 3061F NEG MICROALBUMINURIA REV: CPT | Mod: ,,, | Performed by: FAMILY MEDICINE

## 2022-11-15 PROCEDURE — 3066F PR DOCUMENTATION OF TREATMENT FOR NEPHROPATHY: ICD-10-PCS | Mod: ,,, | Performed by: FAMILY MEDICINE

## 2022-11-15 PROCEDURE — 3008F PR BODY MASS INDEX (BMI) DOCUMENTED: ICD-10-PCS | Mod: ,,, | Performed by: FAMILY MEDICINE

## 2022-11-15 PROCEDURE — 3079F PR MOST RECENT DIASTOLIC BLOOD PRESSURE 80-89 MM HG: ICD-10-PCS | Mod: ,,, | Performed by: FAMILY MEDICINE

## 2022-11-15 PROCEDURE — 3044F HG A1C LEVEL LT 7.0%: CPT | Mod: ,,, | Performed by: FAMILY MEDICINE

## 2022-11-15 PROCEDURE — 3008F BODY MASS INDEX DOCD: CPT | Mod: ,,, | Performed by: FAMILY MEDICINE

## 2022-11-15 PROCEDURE — 3075F PR MOST RECENT SYSTOLIC BLOOD PRESS GE 130-139MM HG: ICD-10-PCS | Mod: ,,, | Performed by: FAMILY MEDICINE

## 2022-11-15 NOTE — PROGRESS NOTES
"Subjective:      Patient ID: Mike Mims is a 47 y.o. male.    Chief Complaint: Healthy You    Mike Mims a 47 y.o. male presents for follow up on all regular problems which are reviewed and discussed.     Problem List Items Addressed This Visit          Cardiac/Vascular    HTN (hypertension)    Hyperlipidemia     Other Visit Diagnoses       Need for vaccination    -  Primary    Type 2 diabetes mellitus without complication, without long-term current use of insulin        Relevant Orders    Hemoglobin A1C    Routine general medical examination at a health care facility                Past Medical History:  Past Medical History:   Diagnosis Date    Diabetes mellitus     Diabetes mellitus, type 2     HTN (hypertension)     Hyperlipidemia      Past Surgical History:   Procedure Laterality Date    CHOLECYSTECTOMY      HERNIA REPAIR      ROTATOR CUFF REPAIR       Review of patient's allergies indicates:  No Known Allergies  Current Outpatient Medications on File Prior to Visit   Medication Sig Dispense Refill    ascorbic acid, vitamin C, (VITAMIN C) 500 MG tablet Take 500 mg by mouth once daily.      aspirin (ECOTRIN) 81 MG EC tablet Take 81 mg by mouth once daily.      atorvastatin (LIPITOR) 80 MG tablet TAKE 1 TABLET BY MOUTH EVERY DAY 30 tablet 11    celecoxib (CELEBREX) 200 MG capsule Take 1 capsule (200 mg total) by mouth 2 (two) times daily. 60 capsule 5    dapagliflozin-metformin (XIGDUO XR) 5-1,000 mg Take 1 tablet by mouth 2 (two) times a day. 180 tablet 3    insulin detemir U-100 (LEVEMIR) 100 unit/mL injection Inject 50 Units into the skin once daily. 45 mL 3    insulin syringe-needle U-100 (BD VEO INSULIN SYRINGE UF) 1 mL 31 gauge x 15/64" Syrg USE 3 TIMES A DAY TO INJECT INSULIN Strength: 1 mL 31 gauge x 15/64" 100 each 3    lisinopriL (PRINIVIL,ZESTRIL) 5 MG tablet Take 5 mg by mouth once daily.      pantoprazole (PROTONIX) 40 MG tablet TAKE 1 TABLET BY MOUTH EVERY DAY 90 tablet 4    SITagliptin " "(JANUVIA) 100 MG Tab Take 1 tablet (100 mg total) by mouth once daily. 90 tablet 3    vitamin D (VITAMIN D3) 1000 units Tab Take 1,000 Units by mouth 2 (two) times a day.       No current facility-administered medications on file prior to visit.     Social History     Socioeconomic History    Marital status:      Spouse name: andrei    Number of children: 3    Years of education: 16    Highest education level: Bachelor's degree (e.g., BA, AB, BS)   Tobacco Use    Smoking status: Never    Smokeless tobacco: Never   Substance and Sexual Activity    Alcohol use: Yes     Comment: occasional liquor    Drug use: Never    Sexual activity: Yes     Family History   Problem Relation Age of Onset    Diabetes Mother     Diabetes Father     Prostate cancer Father     Diabetes Sister     Diabetes Brother     Diabetes Sister     Depression Sister     No Known Problems Sister        Review of Systems   Constitutional: Negative.    HENT:  Negative for congestion, ear pain, nosebleeds and trouble swallowing.    Eyes:  Negative for pain and itching.   Respiratory:  Negative for chest tightness.    Cardiovascular:  Negative for chest pain.   Gastrointestinal:  Negative for abdominal distention.   Endocrine: Negative for cold intolerance and heat intolerance.   Genitourinary:  Negative for difficulty urinating.   Musculoskeletal:  Negative for arthralgias.   Neurological:  Negative for dizziness.   Psychiatric/Behavioral:  Negative for agitation, behavioral problems, confusion and decreased concentration.      Objective:     /86 (BP Location: Left arm, Patient Position: Sitting, BP Method: Large (Manual))   Pulse 81   Resp 18   Ht 5' 9" (1.753 m)   Wt 96.6 kg (213 lb)   SpO2 99%   BMI 31.45 kg/m²     Physical Exam  Constitutional:       Appearance: Normal appearance. He is obese.   HENT:      Head: Normocephalic and atraumatic.      Right Ear: External ear normal.      Left Ear: External ear normal.      Nose: Nose " normal. No rhinorrhea.      Mouth/Throat:      Mouth: Mucous membranes are moist.      Pharynx: Oropharynx is clear. No posterior oropharyngeal erythema.   Eyes:      Pupils: Pupils are equal, round, and reactive to light.   Cardiovascular:      Rate and Rhythm: Normal rate and regular rhythm.      Heart sounds: Normal heart sounds.   Pulmonary:      Effort: Pulmonary effort is normal.      Breath sounds: Normal breath sounds.   Abdominal:      General: There is no distension.      Palpations: Abdomen is soft.      Tenderness: There is no abdominal tenderness.   Musculoskeletal:         General: Normal range of motion.      Cervical back: Normal range of motion and neck supple.   Skin:     General: Skin is warm and dry.   Neurological:      General: No focal deficit present.      Mental Status: He is alert.   Psychiatric:         Mood and Affect: Mood normal.         Behavior: Behavior normal.         Thought Content: Thought content normal.         Judgment: Judgment normal.   Assessment:     1. Need for vaccination    2. Type 2 diabetes mellitus without complication, without long-term current use of insulin    3. Primary hypertension    4. Hyperlipidemia, unspecified hyperlipidemia type    5. Routine general medical examination at a health care facility        Plan:     Problem List Items Addressed This Visit          Cardiac/Vascular    HTN (hypertension)    Hyperlipidemia     Other Visit Diagnoses       Need for vaccination    -  Primary    Type 2 diabetes mellitus without complication, without long-term current use of insulin        Relevant Orders    Hemoglobin A1C    Routine general medical examination at a health care facility              No follow-ups on file.  4m fu    I am having Mike Mims maintain his lisinopriL, vitamin D, aspirin, pantoprazole, ascorbic acid (vitamin C), celecoxib, atorvastatin, SITagliptin phosphate, insulin detemir U-100, dapagliflozin-metformin, and insulin syringe-needle  U-100.    Mike was seen today for healthy you.    Diagnoses and all orders for this visit:    Need for vaccination  -     Influenza - Quadrivalent *Preferred* (6 months+) (PF)    Type 2 diabetes mellitus without complication, without long-term current use of insulin  -     Hemoglobin A1C; Future    Primary hypertension    Hyperlipidemia, unspecified hyperlipidemia type    Routine general medical examination at a health care facility         [unfilled]  Orders Placed This Encounter   Procedures    Influenza - Quadrivalent *Preferred* (6 months+) (PF)    Hemoglobin A1C     Standing Status:   Future     Standing Expiration Date:   1/14/2024

## 2022-11-16 ENCOUNTER — TELEPHONE (OUTPATIENT)
Dept: PRIMARY CARE CLINIC | Facility: CLINIC | Age: 47
End: 2022-11-16
Payer: COMMERCIAL

## 2022-11-16 NOTE — TELEPHONE ENCOUNTER
----- Message from Ankit Lopes III, DO sent at 11/16/2022  8:10 AM CST -----  Labs good please let know

## 2023-01-03 RX ORDER — FLUCONAZOLE 200 MG/1
200 TABLET ORAL DAILY
Qty: 15 TABLET | Refills: 2 | Status: SHIPPED | OUTPATIENT
Start: 2023-01-03 | End: 2023-02-02

## 2023-02-20 PROBLEM — Z00.00 ROUTINE GENERAL MEDICAL EXAMINATION AT A HEALTH CARE FACILITY: Status: RESOLVED | Noted: 2022-11-15 | Resolved: 2023-02-20

## 2023-03-22 ENCOUNTER — TELEPHONE (OUTPATIENT)
Dept: PRIMARY CARE CLINIC | Facility: CLINIC | Age: 48
End: 2023-03-22
Payer: COMMERCIAL

## 2023-03-22 ENCOUNTER — OFFICE VISIT (OUTPATIENT)
Dept: PRIMARY CARE CLINIC | Facility: CLINIC | Age: 48
End: 2023-03-22
Payer: COMMERCIAL

## 2023-03-22 VITALS
BODY MASS INDEX: 32.14 KG/M2 | HEART RATE: 88 BPM | OXYGEN SATURATION: 98 % | SYSTOLIC BLOOD PRESSURE: 134 MMHG | DIASTOLIC BLOOD PRESSURE: 86 MMHG | HEIGHT: 69 IN | WEIGHT: 217 LBS | RESPIRATION RATE: 18 BRPM

## 2023-03-22 DIAGNOSIS — I95.1 ORTHOSTASIS: ICD-10-CM

## 2023-03-22 DIAGNOSIS — E11.01 TYPE 2 DIABETES MELLITUS WITH HYPEROSMOLAR COMA, WITH LONG-TERM CURRENT USE OF INSULIN: ICD-10-CM

## 2023-03-22 DIAGNOSIS — M62.830 BACK MUSCLE SPASM: ICD-10-CM

## 2023-03-22 DIAGNOSIS — I10 PRIMARY HYPERTENSION: ICD-10-CM

## 2023-03-22 DIAGNOSIS — Z79.4 TYPE 2 DIABETES MELLITUS WITH HYPEROSMOLAR COMA, WITH LONG-TERM CURRENT USE OF INSULIN: ICD-10-CM

## 2023-03-22 DIAGNOSIS — E11.9 TYPE 2 DIABETES MELLITUS WITHOUT COMPLICATION, WITHOUT LONG-TERM CURRENT USE OF INSULIN: Primary | ICD-10-CM

## 2023-03-22 DIAGNOSIS — E78.5 HYPERLIPIDEMIA, UNSPECIFIED HYPERLIPIDEMIA TYPE: ICD-10-CM

## 2023-03-22 PROCEDURE — 3008F BODY MASS INDEX DOCD: CPT | Mod: ,,, | Performed by: FAMILY MEDICINE

## 2023-03-22 PROCEDURE — 3075F SYST BP GE 130 - 139MM HG: CPT | Mod: ,,, | Performed by: FAMILY MEDICINE

## 2023-03-22 PROCEDURE — 99214 OFFICE O/P EST MOD 30 MIN: CPT | Mod: ,,, | Performed by: FAMILY MEDICINE

## 2023-03-22 PROCEDURE — 1159F PR MEDICATION LIST DOCUMENTED IN MEDICAL RECORD: ICD-10-PCS | Mod: ,,, | Performed by: FAMILY MEDICINE

## 2023-03-22 PROCEDURE — 3079F PR MOST RECENT DIASTOLIC BLOOD PRESSURE 80-89 MM HG: ICD-10-PCS | Mod: ,,, | Performed by: FAMILY MEDICINE

## 2023-03-22 PROCEDURE — 3008F PR BODY MASS INDEX (BMI) DOCUMENTED: ICD-10-PCS | Mod: ,,, | Performed by: FAMILY MEDICINE

## 2023-03-22 PROCEDURE — 3075F PR MOST RECENT SYSTOLIC BLOOD PRESS GE 130-139MM HG: ICD-10-PCS | Mod: ,,, | Performed by: FAMILY MEDICINE

## 2023-03-22 PROCEDURE — 3079F DIAST BP 80-89 MM HG: CPT | Mod: ,,, | Performed by: FAMILY MEDICINE

## 2023-03-22 PROCEDURE — 99214 PR OFFICE/OUTPT VISIT, EST, LEVL IV, 30-39 MIN: ICD-10-PCS | Mod: ,,, | Performed by: FAMILY MEDICINE

## 2023-03-22 PROCEDURE — 1159F MED LIST DOCD IN RCRD: CPT | Mod: ,,, | Performed by: FAMILY MEDICINE

## 2023-03-22 RX ORDER — LISINOPRIL 5 MG/1
5 TABLET ORAL DAILY
Qty: 90 TABLET | Refills: 3 | Status: SHIPPED | OUTPATIENT
Start: 2023-03-22 | End: 2024-04-01

## 2023-03-22 RX ORDER — PANTOPRAZOLE SODIUM 40 MG/1
40 TABLET, DELAYED RELEASE ORAL DAILY
Qty: 90 TABLET | Refills: 3 | Status: SHIPPED | OUTPATIENT
Start: 2023-03-22 | End: 2024-04-01

## 2023-03-22 RX ORDER — CELECOXIB 200 MG/1
200 CAPSULE ORAL 2 TIMES DAILY
Qty: 60 CAPSULE | Refills: 5 | Status: SHIPPED | OUTPATIENT
Start: 2023-03-22 | End: 2023-11-15 | Stop reason: SDUPTHER

## 2023-03-22 NOTE — PROGRESS NOTES
"Subjective:      Patient ID: Mike Mims is a 47 y.o. male.    Chief Complaint: Follow-up (Check-up) and Hypertension    Mike Mims a 47 y.o. male presents for follow up on all regular problems which are reviewed and discussed.     Problem List Items Addressed This Visit          Cardiac/Vascular    HTN (hypertension)    Hyperlipidemia    Orthostasis       Endocrine    Type 2 diabetes mellitus, with long-term current use of insulin       Orthopedic    Back muscle spasm     Other Visit Diagnoses       Type 2 diabetes mellitus without complication, without long-term current use of insulin    -  Primary    Relevant Orders    Hemoglobin A1C    Comprehensive Metabolic Panel    Lipid Panel    CBC Auto Differential    TSH            Past Medical History:  Past Medical History:   Diagnosis Date    Diabetes mellitus     Diabetes mellitus, type 2     HTN (hypertension)     Hyperlipidemia      Past Surgical History:   Procedure Laterality Date    CHOLECYSTECTOMY      HERNIA REPAIR      ROTATOR CUFF REPAIR       Review of patient's allergies indicates:  No Known Allergies  Current Outpatient Medications on File Prior to Visit   Medication Sig Dispense Refill    ascorbic acid, vitamin C, (VITAMIN C) 500 MG tablet Take 500 mg by mouth once daily.      aspirin (ECOTRIN) 81 MG EC tablet Take 81 mg by mouth once daily.      atorvastatin (LIPITOR) 80 MG tablet TAKE 1 TABLET BY MOUTH EVERY DAY 30 tablet 11    dapagliflozin-metformin (XIGDUO XR) 5-1,000 mg Take 1 tablet by mouth 2 (two) times a day. 180 tablet 3    insulin detemir U-100 (LEVEMIR) 100 unit/mL injection Inject 50 Units into the skin once daily. 45 mL 3    insulin syringe-needle U-100 (BD VEO INSULIN SYRINGE UF) 1 mL 31 gauge x 15/64" Syrg USE 3 TIMES A DAY TO INJECT INSULIN Strength: 1 mL 31 gauge x 15/64" 100 each 3    SITagliptin (JANUVIA) 100 MG Tab Take 1 tablet (100 mg total) by mouth once daily. 90 tablet 3    vitamin D (VITAMIN D3) 1000 units Tab Take 1,000 " "Units by mouth 2 (two) times a day.      [DISCONTINUED] celecoxib (CELEBREX) 200 MG capsule Take 1 capsule (200 mg total) by mouth 2 (two) times daily. 60 capsule 5    [DISCONTINUED] lisinopriL (PRINIVIL,ZESTRIL) 5 MG tablet Take 5 mg by mouth once daily.      [DISCONTINUED] pantoprazole (PROTONIX) 40 MG tablet TAKE 1 TABLET BY MOUTH EVERY DAY 90 tablet 4     No current facility-administered medications on file prior to visit.     Social History     Socioeconomic History    Marital status:      Spouse name: andrei    Number of children: 3    Years of education: 16    Highest education level: Bachelor's degree (e.g., BA, AB, BS)   Tobacco Use    Smoking status: Never    Smokeless tobacco: Never   Substance and Sexual Activity    Alcohol use: Yes     Comment: occasional liquor    Drug use: Never    Sexual activity: Yes     Family History   Problem Relation Age of Onset    Diabetes Mother     Diabetes Father     Prostate cancer Father     Diabetes Sister     Diabetes Brother     Diabetes Sister     Depression Sister     No Known Problems Sister        Review of Systems   Constitutional: Negative.    HENT:  Negative for congestion, ear pain, nosebleeds and trouble swallowing.    Eyes:  Negative for pain and itching.   Respiratory:  Negative for chest tightness.    Cardiovascular:  Negative for chest pain.   Gastrointestinal:  Negative for abdominal distention.   Endocrine: Negative for cold intolerance and heat intolerance.   Genitourinary:  Negative for difficulty urinating.   Musculoskeletal:  Negative for arthralgias.   Neurological:  Positive for dizziness.     Objective:     /86 (BP Location: Left arm, Patient Position: Sitting, BP Method: Large (Manual))   Pulse 88   Resp 18   Ht 5' 9" (1.753 m)   Wt 98.4 kg (217 lb)   SpO2 98%   BMI 32.05 kg/m²     Physical Exam  Constitutional:       Appearance: Normal appearance. He is obese.   HENT:      Head: Normocephalic and atraumatic.      Right Ear: " External ear normal.      Left Ear: External ear normal.      Nose: Congestion present.      Mouth/Throat:      Mouth: Mucous membranes are moist.      Pharynx: Oropharynx is clear.   Eyes:      Pupils: Pupils are equal, round, and reactive to light.   Cardiovascular:      Rate and Rhythm: Normal rate and regular rhythm.      Heart sounds: Normal heart sounds. No murmur heard.    No gallop.   Pulmonary:      Effort: Pulmonary effort is normal. No respiratory distress.      Breath sounds: Normal breath sounds. No wheezing.   Abdominal:      Palpations: Abdomen is soft.   Musculoskeletal:         General: No swelling or deformity. Normal range of motion.      Cervical back: Normal range of motion and neck supple.   Skin:     General: Skin is warm and dry.      Coloration: Skin is not jaundiced.      Findings: No bruising.   Neurological:      General: No focal deficit present.      Mental Status: He is alert.      Cranial Nerves: No cranial nerve deficit.   Psychiatric:         Mood and Affect: Mood normal.         Behavior: Behavior normal.         Thought Content: Thought content normal.         Judgment: Judgment normal.       1. Type 2 diabetes mellitus without complication, without long-term current use of insulin    2. Orthostasis    3. Hyperlipidemia, unspecified hyperlipidemia type    4. Primary hypertension    5. Type 2 diabetes mellitus with hyperosmolar coma, with long-term current use of insulin    6. Back muscle spasm        Plan:     Problem List Items Addressed This Visit          Cardiac/Vascular    HTN (hypertension)    Hyperlipidemia    Orthostasis       Endocrine    Type 2 diabetes mellitus, with long-term current use of insulin       Orthopedic    Back muscle spasm     Other Visit Diagnoses       Type 2 diabetes mellitus without complication, without long-term current use of insulin    -  Primary    Relevant Orders    Hemoglobin A1C    Comprehensive Metabolic Panel    Lipid Panel    CBC Auto  Differential    TSH          No follow-ups on file.  6m fu  Change positions cautiously  Drink more fluids  Lab work  zyrtec    I have changed Mike Mims's pantoprazole and lisinopriL. I am also having him maintain his vitamin D, aspirin, ascorbic acid (vitamin C), atorvastatin, SITagliptin phosphate, insulin detemir U-100, dapagliflozin-metformin, insulin syringe-needle U-100, and celecoxib.    Mike was seen today for follow-up and hypertension.    Diagnoses and all orders for this visit:    Type 2 diabetes mellitus without complication, without long-term current use of insulin  -     Hemoglobin A1C; Future  -     Comprehensive Metabolic Panel; Future  -     Lipid Panel; Future  -     CBC Auto Differential; Future  -     TSH; Future    Orthostasis    Hyperlipidemia, unspecified hyperlipidemia type    Primary hypertension    Type 2 diabetes mellitus with hyperosmolar coma, with long-term current use of insulin    Back muscle spasm    Other orders  -     pantoprazole (PROTONIX) 40 MG tablet; Take 1 tablet (40 mg total) by mouth once daily.  -     lisinopriL (PRINIVIL,ZESTRIL) 5 MG tablet; Take 1 tablet (5 mg total) by mouth once daily.  -     celecoxib (CELEBREX) 200 MG capsule; Take 1 capsule (200 mg total) by mouth 2 (two) times daily.      Medications Ordered This Encounter   Medications    celecoxib (CELEBREX) 200 MG capsule     Sig: Take 1 capsule (200 mg total) by mouth 2 (two) times daily.     Dispense:  60 capsule     Refill:  5    lisinopriL (PRINIVIL,ZESTRIL) 5 MG tablet     Sig: Take 1 tablet (5 mg total) by mouth once daily.     Dispense:  90 tablet     Refill:  3     .    pantoprazole (PROTONIX) 40 MG tablet     Sig: Take 1 tablet (40 mg total) by mouth once daily.     Dispense:  90 tablet     Refill:  3     [unfilled]  Orders Placed This Encounter   Procedures    Hemoglobin A1C     Standing Status:   Future     Number of Occurrences:   1     Standing Expiration Date:   5/20/2024    Comprehensive  Metabolic Panel     Standing Status:   Future     Number of Occurrences:   1     Standing Expiration Date:   5/20/2024    Lipid Panel     Standing Status:   Future     Number of Occurrences:   1     Standing Expiration Date:   5/20/2024    CBC Auto Differential     Standing Status:   Future     Number of Occurrences:   1     Standing Expiration Date:   5/20/2024    TSH     Standing Status:   Future     Number of Occurrences:   1     Standing Expiration Date:   5/20/2024

## 2023-07-18 ENCOUNTER — OFFICE VISIT (OUTPATIENT)
Dept: PRIMARY CARE CLINIC | Facility: CLINIC | Age: 48
End: 2023-07-18
Payer: COMMERCIAL

## 2023-07-18 ENCOUNTER — TELEPHONE (OUTPATIENT)
Dept: PRIMARY CARE CLINIC | Facility: CLINIC | Age: 48
End: 2023-07-18
Payer: COMMERCIAL

## 2023-07-18 ENCOUNTER — HOSPITAL ENCOUNTER (OUTPATIENT)
Dept: RADIOLOGY | Facility: HOSPITAL | Age: 48
Discharge: HOME | End: 2023-07-18
Attending: FAMILY MEDICINE
Payer: COMMERCIAL

## 2023-07-18 VITALS
RESPIRATION RATE: 18 BRPM | HEART RATE: 97 BPM | SYSTOLIC BLOOD PRESSURE: 122 MMHG | BODY MASS INDEX: 32.88 KG/M2 | OXYGEN SATURATION: 99 % | WEIGHT: 222 LBS | DIASTOLIC BLOOD PRESSURE: 82 MMHG | HEIGHT: 69 IN

## 2023-07-18 DIAGNOSIS — M54.9 BACK PAIN, UNSPECIFIED BACK LOCATION, UNSPECIFIED BACK PAIN LATERALITY, UNSPECIFIED CHRONICITY: ICD-10-CM

## 2023-07-18 DIAGNOSIS — I10 PRIMARY HYPERTENSION: ICD-10-CM

## 2023-07-18 DIAGNOSIS — E11.01 TYPE 2 DIABETES MELLITUS WITH HYPEROSMOLAR COMA, WITH LONG-TERM CURRENT USE OF INSULIN: ICD-10-CM

## 2023-07-18 DIAGNOSIS — E78.5 HYPERLIPIDEMIA, UNSPECIFIED HYPERLIPIDEMIA TYPE: ICD-10-CM

## 2023-07-18 DIAGNOSIS — M54.9 BACK PAIN, UNSPECIFIED BACK LOCATION, UNSPECIFIED BACK PAIN LATERALITY, UNSPECIFIED CHRONICITY: Primary | ICD-10-CM

## 2023-07-18 DIAGNOSIS — Z79.4 TYPE 2 DIABETES MELLITUS WITH HYPEROSMOLAR COMA, WITH LONG-TERM CURRENT USE OF INSULIN: ICD-10-CM

## 2023-07-18 PROCEDURE — 3044F HG A1C LEVEL LT 7.0%: CPT | Mod: ,,, | Performed by: FAMILY MEDICINE

## 2023-07-18 PROCEDURE — 3044F PR MOST RECENT HEMOGLOBIN A1C LEVEL <7.0%: ICD-10-PCS | Mod: ,,, | Performed by: FAMILY MEDICINE

## 2023-07-18 PROCEDURE — 3008F BODY MASS INDEX DOCD: CPT | Mod: ,,, | Performed by: FAMILY MEDICINE

## 2023-07-18 PROCEDURE — 3074F PR MOST RECENT SYSTOLIC BLOOD PRESSURE < 130 MM HG: ICD-10-PCS | Mod: ,,, | Performed by: FAMILY MEDICINE

## 2023-07-18 PROCEDURE — 4010F ACE/ARB THERAPY RXD/TAKEN: CPT | Mod: ,,, | Performed by: FAMILY MEDICINE

## 2023-07-18 PROCEDURE — 1159F PR MEDICATION LIST DOCUMENTED IN MEDICAL RECORD: ICD-10-PCS | Mod: ,,, | Performed by: FAMILY MEDICINE

## 2023-07-18 PROCEDURE — 3074F SYST BP LT 130 MM HG: CPT | Mod: ,,, | Performed by: FAMILY MEDICINE

## 2023-07-18 PROCEDURE — 74018 XR KUB: ICD-10-PCS | Mod: 26,,, | Performed by: RADIOLOGY

## 2023-07-18 PROCEDURE — 99214 OFFICE O/P EST MOD 30 MIN: CPT | Mod: ,,, | Performed by: FAMILY MEDICINE

## 2023-07-18 PROCEDURE — 3079F PR MOST RECENT DIASTOLIC BLOOD PRESSURE 80-89 MM HG: ICD-10-PCS | Mod: ,,, | Performed by: FAMILY MEDICINE

## 2023-07-18 PROCEDURE — 3008F PR BODY MASS INDEX (BMI) DOCUMENTED: ICD-10-PCS | Mod: ,,, | Performed by: FAMILY MEDICINE

## 2023-07-18 PROCEDURE — 72100 X-RAY EXAM L-S SPINE 2/3 VWS: CPT | Mod: TC

## 2023-07-18 PROCEDURE — 1159F MED LIST DOCD IN RCRD: CPT | Mod: ,,, | Performed by: FAMILY MEDICINE

## 2023-07-18 PROCEDURE — 4010F PR ACE/ARB THEARPY RXD/TAKEN: ICD-10-PCS | Mod: ,,, | Performed by: FAMILY MEDICINE

## 2023-07-18 PROCEDURE — 74018 RADEX ABDOMEN 1 VIEW: CPT | Mod: TC

## 2023-07-18 PROCEDURE — 3079F DIAST BP 80-89 MM HG: CPT | Mod: ,,, | Performed by: FAMILY MEDICINE

## 2023-07-18 PROCEDURE — 72100 XR LUMBAR SPINE AP AND LATERAL: ICD-10-PCS | Mod: 26,,, | Performed by: RADIOLOGY

## 2023-07-18 PROCEDURE — 74018 RADEX ABDOMEN 1 VIEW: CPT | Mod: 26,,, | Performed by: RADIOLOGY

## 2023-07-18 PROCEDURE — 72100 X-RAY EXAM L-S SPINE 2/3 VWS: CPT | Mod: 26,,, | Performed by: RADIOLOGY

## 2023-07-18 PROCEDURE — 99214 PR OFFICE/OUTPT VISIT, EST, LEVL IV, 30-39 MIN: ICD-10-PCS | Mod: ,,, | Performed by: FAMILY MEDICINE

## 2023-07-18 RX ORDER — METHOCARBAMOL 750 MG/1
1500 TABLET, FILM COATED ORAL 4 TIMES DAILY
Qty: 80 TABLET | Refills: 5 | Status: SHIPPED | OUTPATIENT
Start: 2023-07-18 | End: 2023-07-28

## 2023-07-18 RX ORDER — HYDROCODONE BITARTRATE AND ACETAMINOPHEN 10; 325 MG/1; MG/1
1 TABLET ORAL EVERY 6 HOURS PRN
Qty: 30 TABLET | Refills: 0 | Status: SHIPPED | OUTPATIENT
Start: 2023-07-18 | End: 2023-09-20 | Stop reason: SDUPTHER

## 2023-07-18 NOTE — PROGRESS NOTES
"Subjective:      Patient ID: Mike Mims is a 48 y.o. male.    Chief Complaint: Back Pain ((L) side/)    Mike Mims a 48 y.o. male presents for follow up on all regular problems which are reviewed and discussed.   'left backstrap" no injury  Problem List Items Addressed This Visit          Cardiac/Vascular    HTN (hypertension)    Hyperlipidemia       Endocrine    Type 2 diabetes mellitus, with long-term current use of insulin       Orthopedic    Back pain - Primary    Relevant Orders    X-Ray KUB    X-Ray Lumbar Spine AP And Lateral       Past Medical History:  Past Medical History:   Diagnosis Date    Diabetes mellitus     Diabetes mellitus, type 2     HTN (hypertension)     Hyperlipidemia      Past Surgical History:   Procedure Laterality Date    CHOLECYSTECTOMY      HERNIA REPAIR      ROTATOR CUFF REPAIR       Review of patient's allergies indicates:  No Known Allergies  Current Outpatient Medications on File Prior to Visit   Medication Sig Dispense Refill    ascorbic acid, vitamin C, (VITAMIN C) 500 MG tablet Take 500 mg by mouth once daily.      aspirin (ECOTRIN) 81 MG EC tablet Take 81 mg by mouth once daily.      atorvastatin (LIPITOR) 80 MG tablet TAKE 1 TABLET BY MOUTH EVERY DAY 30 tablet 11    celecoxib (CELEBREX) 200 MG capsule Take 1 capsule (200 mg total) by mouth 2 (two) times daily. 60 capsule 5    dapagliflozin-metformin (XIGDUO XR) 5-1,000 mg Take 1 tablet by mouth 2 (two) times a day. 180 tablet 3    insulin detemir U-100 (LEVEMIR) 100 unit/mL injection Inject 50 Units into the skin once daily. 45 mL 3    insulin syringe-needle U-100 (BD VEO INSULIN SYRINGE UF) 1 mL 31 gauge x 15/64" Syrg USE 3 TIMES A DAY TO INJECT INSULIN Strength: 1 mL 31 gauge x 15/64" 100 each 3    lisinopriL (PRINIVIL,ZESTRIL) 5 MG tablet Take 1 tablet (5 mg total) by mouth once daily. 90 tablet 3    pantoprazole (PROTONIX) 40 MG tablet Take 1 tablet (40 mg total) by mouth once daily. 90 tablet 3    SITagliptin " "(JANUVIA) 100 MG Tab Take 1 tablet (100 mg total) by mouth once daily. 90 tablet 3    vitamin D (VITAMIN D3) 1000 units Tab Take 1,000 Units by mouth 2 (two) times a day.       No current facility-administered medications on file prior to visit.     Social History     Socioeconomic History    Marital status:      Spouse name: andrei    Number of children: 3    Years of education: 16    Highest education level: Bachelor's degree (e.g., BA, AB, BS)   Tobacco Use    Smoking status: Never    Smokeless tobacco: Never   Substance and Sexual Activity    Alcohol use: Yes     Comment: occasional liquor    Drug use: Never    Sexual activity: Yes     Family History   Problem Relation Age of Onset    Diabetes Mother     Diabetes Father     Prostate cancer Father     Diabetes Sister     Diabetes Brother     Diabetes Sister     Depression Sister     No Known Problems Sister        Review of Systems   Constitutional: Negative.    HENT:  Negative for congestion, ear pain, nosebleeds and trouble swallowing.    Eyes:  Negative for pain and itching.   Respiratory:  Negative for chest tightness.    Cardiovascular:  Negative for chest pain.   Gastrointestinal:  Negative for abdominal distention.   Endocrine: Negative for cold intolerance and heat intolerance.   Genitourinary:  Negative for difficulty urinating.   Musculoskeletal:  Positive for arthralgias, back pain, joint swelling and myalgias.   Neurological:  Negative for dizziness.     Objective:     /82 (BP Location: Right arm, Patient Position: Sitting, BP Method: Large (Manual))   Pulse 97   Resp 18   Ht 5' 9" (1.753 m)   Wt 100.7 kg (222 lb)   SpO2 99%   BMI 32.78 kg/m²     Physical Exam  Constitutional:       Appearance: Normal appearance. He is obese. He is not ill-appearing or diaphoretic.   HENT:      Head: Normocephalic and atraumatic.      Right Ear: External ear normal.      Left Ear: External ear normal.      Nose: Nose normal.      Mouth/Throat:      " Mouth: Mucous membranes are moist.      Pharynx: Oropharynx is clear.   Eyes:      Pupils: Pupils are equal, round, and reactive to light.   Cardiovascular:      Rate and Rhythm: Normal rate and regular rhythm.      Heart sounds: Normal heart sounds. No murmur heard.  Pulmonary:      Effort: Pulmonary effort is normal. No respiratory distress.      Breath sounds: Normal breath sounds.   Abdominal:      General: There is no distension.      Palpations: Abdomen is soft.   Musculoskeletal:         General: Tenderness present. No swelling, deformity or signs of injury. Normal range of motion.      Cervical back: Normal range of motion and neck supple.   Skin:     General: Skin is warm and dry.   Neurological:      General: No focal deficit present.      Mental Status: He is alert.   Psychiatric:         Mood and Affect: Mood normal.         Behavior: Behavior normal.         Thought Content: Thought content normal.         Judgment: Judgment normal.       1. Back pain, unspecified back location, unspecified back pain laterality, unspecified chronicity    2. Primary hypertension    3. Hyperlipidemia, unspecified hyperlipidemia type    4. Type 2 diabetes mellitus with hyperosmolar coma, with long-term current use of insulin        Plan:     Problem List Items Addressed This Visit          Cardiac/Vascular    HTN (hypertension)    Hyperlipidemia       Endocrine    Type 2 diabetes mellitus, with long-term current use of insulin       Orthopedic    Back pain - Primary    Relevant Orders    X-Ray KUB    X-Ray Lumbar Spine AP And Lateral     No follow-ups on file.      I am having Mike Mims start on HYDROcodone-acetaminophen and methocarbamoL. I am also having him maintain his vitamin D, aspirin, ascorbic acid (vitamin C), atorvastatin, SITagliptin phosphate, insulin detemir U-100, dapaglifloz propaned-metformin, insulin syringe-needle U-100, pantoprazole, lisinopriL, and celecoxib.    Mike was seen today for back  pain.    Diagnoses and all orders for this visit:    Back pain, unspecified back location, unspecified back pain laterality, unspecified chronicity  -     X-Ray KUB; Future  -     X-Ray Lumbar Spine AP And Lateral; Future    Primary hypertension    Hyperlipidemia, unspecified hyperlipidemia type    Type 2 diabetes mellitus with hyperosmolar coma, with long-term current use of insulin    Other orders  -     HYDROcodone-acetaminophen (NORCO)  mg per tablet; Take 1 tablet by mouth every 6 (six) hours as needed for Pain.  -     methocarbamoL (ROBAXIN) 750 MG Tab; Take 2 tablets (1,500 mg total) by mouth 4 (four) times daily. for 10 days      Medications Ordered This Encounter   Medications    HYDROcodone-acetaminophen (NORCO)  mg per tablet     Sig: Take 1 tablet by mouth every 6 (six) hours as needed for Pain.     Dispense:  30 tablet     Refill:  0     n/a      Order Specific Question:   I have reviewed the Prescription Drug Monitoring Program (PDMP) database for this patient prior to prescribing the above opioid medication     Answer:   Yes    methocarbamoL (ROBAXIN) 750 MG Tab     Sig: Take 2 tablets (1,500 mg total) by mouth 4 (four) times daily. for 10 days     Dispense:  80 tablet     Refill:  5     [unfilled]  Orders Placed This Encounter   Procedures    X-Ray KUB     Standing Status:   Future     Standing Expiration Date:   7/18/2024     Order Specific Question:   May the Radiologist modify the order per protocol to meet the clinical needs of the patient?     Answer:   Yes     Order Specific Question:   Release to patient     Answer:   Immediate    X-Ray Lumbar Spine AP And Lateral     Standing Status:   Future     Standing Expiration Date:   7/18/2024     Order Specific Question:   May the Radiologist modify the order per protocol to meet the clinical needs of the patient?     Answer:   Yes

## 2023-07-18 NOTE — TELEPHONE ENCOUNTER
----- Message from Ankit Lopes III, DO sent at 7/18/2023 10:38 AM CDT -----  Celebrex and robaxin should help. Call if no improvement in 2-3 weeks  Has djd

## 2023-07-20 ENCOUNTER — PATIENT MESSAGE (OUTPATIENT)
Dept: ADMINISTRATIVE | Facility: HOSPITAL | Age: 48
End: 2023-07-20

## 2023-09-06 RX ORDER — SYRINGE AND NEEDLE,INSULIN,1ML 31GX15/64"
SYRINGE, EMPTY DISPOSABLE MISCELLANEOUS
Qty: 100 EACH | Refills: 3 | OUTPATIENT
Start: 2023-09-06

## 2023-09-12 ENCOUNTER — HOSPITAL ENCOUNTER (OUTPATIENT)
Facility: HOSPITAL | Age: 48
Discharge: HOME | End: 2023-09-14
Attending: EMERGENCY MEDICINE | Admitting: SURGERY
Payer: COMMERCIAL

## 2023-09-12 DIAGNOSIS — K56.609 SBO (SMALL BOWEL OBSTRUCTION): Primary | ICD-10-CM

## 2023-09-12 DIAGNOSIS — R10.9 ABDOMINAL PAIN: ICD-10-CM

## 2023-09-12 LAB
ALBUMIN SERPL BCP-MCNC: 4 G/DL (ref 3.5–5)
ALBUMIN/GLOB SERPL: 1.1 {RATIO}
ALP SERPL-CCNC: 96 U/L (ref 45–115)
ALT SERPL W P-5'-P-CCNC: 31 U/L (ref 16–61)
ANION GAP SERPL CALCULATED.3IONS-SCNC: 12 MMOL/L (ref 7–16)
AST SERPL W P-5'-P-CCNC: 17 U/L (ref 15–37)
BASOPHILS # BLD AUTO: 0.03 K/UL (ref 0–0.2)
BASOPHILS NFR BLD AUTO: 0.3 % (ref 0–1)
BILIRUB SERPL-MCNC: 0.7 MG/DL (ref ?–1.2)
BUN SERPL-MCNC: 13 MG/DL (ref 7–18)
BUN/CREAT SERPL: 12 (ref 6–20)
CALCIUM SERPL-MCNC: 9.2 MG/DL (ref 8.5–10.1)
CHLORIDE SERPL-SCNC: 107 MMOL/L (ref 98–107)
CO2 SERPL-SCNC: 26 MMOL/L (ref 21–32)
CREAT SERPL-MCNC: 1.12 MG/DL (ref 0.7–1.3)
DIFFERENTIAL METHOD BLD: ABNORMAL
EGFR (NO RACE VARIABLE) (RUSH/TITUS): 81 ML/MIN/1.73M2
EOSINOPHIL # BLD AUTO: 0.11 K/UL (ref 0–0.5)
EOSINOPHIL NFR BLD AUTO: 1.2 % (ref 1–4)
ERYTHROCYTE [DISTWIDTH] IN BLOOD BY AUTOMATED COUNT: 14.6 % (ref 11.5–14.5)
GLOBULIN SER-MCNC: 3.7 G/DL (ref 2–4)
GLUCOSE SERPL-MCNC: 130 MG/DL (ref 74–106)
HCT VFR BLD AUTO: 44.6 % (ref 40–54)
HGB BLD-MCNC: 15.4 G/DL (ref 13.5–18)
IMM GRANULOCYTES # BLD AUTO: 0.02 K/UL (ref 0–0.04)
IMM GRANULOCYTES NFR BLD: 0.2 % (ref 0–0.4)
LIPASE SERPL-CCNC: 36 U/L (ref 73–393)
LYMPHOCYTES # BLD AUTO: 1.87 K/UL (ref 1–4.8)
LYMPHOCYTES NFR BLD AUTO: 20.3 % (ref 27–41)
MCH RBC QN AUTO: 30 PG (ref 27–31)
MCHC RBC AUTO-ENTMCNC: 34.5 G/DL (ref 32–36)
MCV RBC AUTO: 86.8 FL (ref 80–96)
MONOCYTES # BLD AUTO: 0.87 K/UL (ref 0–0.8)
MONOCYTES NFR BLD AUTO: 9.5 % (ref 2–6)
MPC BLD CALC-MCNC: 11 FL (ref 9.4–12.4)
NEUTROPHILS # BLD AUTO: 6.29 K/UL (ref 1.8–7.7)
NEUTROPHILS NFR BLD AUTO: 68.5 % (ref 53–65)
NRBC # BLD AUTO: 0 X10E3/UL
NRBC, AUTO (.00): 0 %
PLATELET # BLD AUTO: 242 K/UL (ref 150–400)
POTASSIUM SERPL-SCNC: 3.9 MMOL/L (ref 3.5–5.1)
PROT SERPL-MCNC: 7.7 G/DL (ref 6.4–8.2)
RBC # BLD AUTO: 5.14 M/UL (ref 4.6–6.2)
SODIUM SERPL-SCNC: 141 MMOL/L (ref 136–145)
WBC # BLD AUTO: 9.19 K/UL (ref 4.5–11)

## 2023-09-12 PROCEDURE — 93005 ELECTROCARDIOGRAM TRACING: CPT

## 2023-09-12 PROCEDURE — 83690 ASSAY OF LIPASE: CPT | Performed by: EMERGENCY MEDICINE

## 2023-09-12 PROCEDURE — 25500020 PHARM REV CODE 255: Performed by: EMERGENCY MEDICINE

## 2023-09-12 PROCEDURE — 96361 HYDRATE IV INFUSION ADD-ON: CPT

## 2023-09-12 PROCEDURE — 99285 EMERGENCY DEPT VISIT HI MDM: CPT | Mod: ,,, | Performed by: EMERGENCY MEDICINE

## 2023-09-12 PROCEDURE — 93010 ELECTROCARDIOGRAM REPORT: CPT | Mod: ,,, | Performed by: INTERNAL MEDICINE

## 2023-09-12 PROCEDURE — 99285 EMERGENCY DEPT VISIT HI MDM: CPT | Mod: 25

## 2023-09-12 PROCEDURE — 96375 TX/PRO/DX INJ NEW DRUG ADDON: CPT

## 2023-09-12 PROCEDURE — 96374 THER/PROPH/DIAG INJ IV PUSH: CPT

## 2023-09-12 PROCEDURE — 93010 EKG 12-LEAD: ICD-10-PCS | Mod: ,,, | Performed by: INTERNAL MEDICINE

## 2023-09-12 PROCEDURE — 99285 PR EMERGENCY DEPT VISIT,LEVEL V: ICD-10-PCS | Mod: ,,, | Performed by: EMERGENCY MEDICINE

## 2023-09-12 PROCEDURE — 85025 COMPLETE CBC W/AUTO DIFF WBC: CPT | Performed by: EMERGENCY MEDICINE

## 2023-09-12 PROCEDURE — 80053 COMPREHEN METABOLIC PANEL: CPT | Performed by: EMERGENCY MEDICINE

## 2023-09-12 PROCEDURE — 63600175 PHARM REV CODE 636 W HCPCS: Performed by: EMERGENCY MEDICINE

## 2023-09-12 RX ORDER — MORPHINE SULFATE 2 MG/ML
2 INJECTION, SOLUTION INTRAMUSCULAR; INTRAVENOUS
Status: COMPLETED | OUTPATIENT
Start: 2023-09-12 | End: 2023-09-12

## 2023-09-12 RX ORDER — ONDANSETRON 2 MG/ML
4 INJECTION INTRAMUSCULAR; INTRAVENOUS
Status: COMPLETED | OUTPATIENT
Start: 2023-09-12 | End: 2023-09-12

## 2023-09-12 RX ADMIN — ONDANSETRON 4 MG: 2 INJECTION INTRAMUSCULAR; INTRAVENOUS at 10:09

## 2023-09-12 RX ADMIN — IOPAMIDOL 100 ML: 755 INJECTION, SOLUTION INTRAVENOUS at 10:09

## 2023-09-12 RX ADMIN — MORPHINE SULFATE 2 MG: 2 INJECTION, SOLUTION INTRAMUSCULAR; INTRAVENOUS at 10:09

## 2023-09-12 RX ADMIN — SODIUM CHLORIDE, POTASSIUM CHLORIDE, SODIUM LACTATE AND CALCIUM CHLORIDE 1000 ML: 600; 310; 30; 20 INJECTION, SOLUTION INTRAVENOUS at 10:09

## 2023-09-13 LAB
BILIRUB UR QL STRIP: NEGATIVE
CLARITY UR: CLEAR
COLOR UR: COLORLESS
EST. AVERAGE GLUCOSE BLD GHB EST-MCNC: 170 MG/DL
GLUCOSE SERPL-MCNC: 124 MG/DL (ref 70–105)
GLUCOSE SERPL-MCNC: 164 MG/DL (ref 70–105)
GLUCOSE UR STRIP-MCNC: >1000 MG/DL
HBA1C MFR BLD HPLC: 7.7 % (ref 4.5–6.6)
KETONES UR STRIP-SCNC: 20 MG/DL
LEUKOCYTE ESTERASE UR QL STRIP: NEGATIVE
NITRITE UR QL STRIP: NEGATIVE
PH UR STRIP: 5.5 PH UNITS
PROT UR QL STRIP: 10
RBC # UR STRIP: NEGATIVE /UL
SARS-COV-2 RDRP RESP QL NAA+PROBE: NEGATIVE
SP GR UR STRIP: >1.05
UROBILINOGEN UR STRIP-ACNC: NORMAL MG/DL

## 2023-09-13 PROCEDURE — G0378 HOSPITAL OBSERVATION PER HR: HCPCS

## 2023-09-13 PROCEDURE — 25500020 PHARM REV CODE 255: Performed by: SURGERY

## 2023-09-13 PROCEDURE — 96361 HYDRATE IV INFUSION ADD-ON: CPT

## 2023-09-13 PROCEDURE — 96372 THER/PROPH/DIAG INJ SC/IM: CPT | Mod: 59 | Performed by: SURGERY

## 2023-09-13 PROCEDURE — 63600175 PHARM REV CODE 636 W HCPCS: Performed by: SURGERY

## 2023-09-13 PROCEDURE — 81003 URINALYSIS AUTO W/O SCOPE: CPT | Performed by: EMERGENCY MEDICINE

## 2023-09-13 PROCEDURE — 63600175 PHARM REV CODE 636 W HCPCS: Performed by: EMERGENCY MEDICINE

## 2023-09-13 PROCEDURE — 96375 TX/PRO/DX INJ NEW DRUG ADDON: CPT

## 2023-09-13 PROCEDURE — 87635 SARS-COV-2 COVID-19 AMP PRB: CPT | Performed by: EMERGENCY MEDICINE

## 2023-09-13 PROCEDURE — 82962 GLUCOSE BLOOD TEST: CPT

## 2023-09-13 PROCEDURE — 96376 TX/PRO/DX INJ SAME DRUG ADON: CPT

## 2023-09-13 PROCEDURE — 83036 HEMOGLOBIN GLYCOSYLATED A1C: CPT | Performed by: SURGERY

## 2023-09-13 PROCEDURE — 99900035 HC TECH TIME PER 15 MIN (STAT)

## 2023-09-13 RX ORDER — ENOXAPARIN SODIUM 100 MG/ML
40 INJECTION SUBCUTANEOUS EVERY 24 HOURS
Status: DISCONTINUED | OUTPATIENT
Start: 2023-09-13 | End: 2023-09-14 | Stop reason: HOSPADM

## 2023-09-13 RX ORDER — PROCHLORPERAZINE EDISYLATE 5 MG/ML
5 INJECTION INTRAMUSCULAR; INTRAVENOUS EVERY 6 HOURS PRN
Status: DISCONTINUED | OUTPATIENT
Start: 2023-09-13 | End: 2023-09-14 | Stop reason: HOSPADM

## 2023-09-13 RX ORDER — SODIUM CHLORIDE 0.9 % (FLUSH) 0.9 %
10 SYRINGE (ML) INJECTION
Status: DISCONTINUED | OUTPATIENT
Start: 2023-09-13 | End: 2023-09-14 | Stop reason: HOSPADM

## 2023-09-13 RX ORDER — MORPHINE SULFATE 2 MG/ML
2 INJECTION, SOLUTION INTRAMUSCULAR; INTRAVENOUS
Status: COMPLETED | OUTPATIENT
Start: 2023-09-13 | End: 2023-09-13

## 2023-09-13 RX ORDER — HYDROMORPHONE HYDROCHLORIDE 2 MG/ML
1 INJECTION, SOLUTION INTRAMUSCULAR; INTRAVENOUS; SUBCUTANEOUS EVERY 4 HOURS PRN
Status: DISCONTINUED | OUTPATIENT
Start: 2023-09-13 | End: 2023-09-14 | Stop reason: HOSPADM

## 2023-09-13 RX ORDER — TALC
6 POWDER (GRAM) TOPICAL NIGHTLY PRN
Status: DISCONTINUED | OUTPATIENT
Start: 2023-09-13 | End: 2023-09-14 | Stop reason: HOSPADM

## 2023-09-13 RX ORDER — SODIUM CHLORIDE, SODIUM LACTATE, POTASSIUM CHLORIDE, CALCIUM CHLORIDE 600; 310; 30; 20 MG/100ML; MG/100ML; MG/100ML; MG/100ML
INJECTION, SOLUTION INTRAVENOUS CONTINUOUS
Status: DISCONTINUED | OUTPATIENT
Start: 2023-09-13 | End: 2023-09-14 | Stop reason: HOSPADM

## 2023-09-13 RX ORDER — ONDANSETRON 2 MG/ML
4 INJECTION INTRAMUSCULAR; INTRAVENOUS EVERY 8 HOURS PRN
Status: DISCONTINUED | OUTPATIENT
Start: 2023-09-13 | End: 2023-09-14 | Stop reason: HOSPADM

## 2023-09-13 RX ORDER — GLUCAGON 1 MG
1 KIT INJECTION
Status: DISCONTINUED | OUTPATIENT
Start: 2023-09-13 | End: 2023-09-14 | Stop reason: HOSPADM

## 2023-09-13 RX ORDER — ACETAMINOPHEN 650 MG/1
650 SUPPOSITORY RECTAL EVERY 8 HOURS PRN
Status: DISCONTINUED | OUTPATIENT
Start: 2023-09-13 | End: 2023-09-14 | Stop reason: HOSPADM

## 2023-09-13 RX ORDER — MORPHINE SULFATE 2 MG/ML
2 INJECTION, SOLUTION INTRAMUSCULAR; INTRAVENOUS EVERY 4 HOURS PRN
Status: DISCONTINUED | OUTPATIENT
Start: 2023-09-13 | End: 2023-09-14 | Stop reason: HOSPADM

## 2023-09-13 RX ORDER — INSULIN ASPART 100 [IU]/ML
0-10 INJECTION, SOLUTION INTRAVENOUS; SUBCUTANEOUS EVERY 6 HOURS PRN
Status: DISCONTINUED | OUTPATIENT
Start: 2023-09-13 | End: 2023-09-14 | Stop reason: HOSPADM

## 2023-09-13 RX ADMIN — HYDROMORPHONE HYDROCHLORIDE 1 MG: 2 INJECTION, SOLUTION INTRAMUSCULAR; INTRAVENOUS; SUBCUTANEOUS at 01:09

## 2023-09-13 RX ADMIN — MORPHINE SULFATE 2 MG: 2 INJECTION, SOLUTION INTRAMUSCULAR; INTRAVENOUS at 12:09

## 2023-09-13 RX ADMIN — ONDANSETRON 4 MG: 2 INJECTION INTRAMUSCULAR; INTRAVENOUS at 01:09

## 2023-09-13 RX ADMIN — SODIUM CHLORIDE, POTASSIUM CHLORIDE, SODIUM LACTATE AND CALCIUM CHLORIDE: 600; 310; 30; 20 INJECTION, SOLUTION INTRAVENOUS at 11:09

## 2023-09-13 RX ADMIN — DIATRIZOATE MEGLUMINE AND DIATRIZOATE SODIUM 120 ML: 660; 100 LIQUID ORAL; RECTAL at 09:09

## 2023-09-13 RX ADMIN — HYDROMORPHONE HYDROCHLORIDE 1 MG: 2 INJECTION, SOLUTION INTRAMUSCULAR; INTRAVENOUS; SUBCUTANEOUS at 06:09

## 2023-09-13 RX ADMIN — SODIUM CHLORIDE, POTASSIUM CHLORIDE, SODIUM LACTATE AND CALCIUM CHLORIDE: 600; 310; 30; 20 INJECTION, SOLUTION INTRAVENOUS at 05:09

## 2023-09-13 RX ADMIN — PROCHLORPERAZINE EDISYLATE 5 MG: 5 INJECTION INTRAMUSCULAR; INTRAVENOUS at 06:09

## 2023-09-13 RX ADMIN — ENOXAPARIN SODIUM 40 MG: 100 INJECTION SUBCUTANEOUS at 06:09

## 2023-09-13 RX ADMIN — SODIUM CHLORIDE, POTASSIUM CHLORIDE, SODIUM LACTATE AND CALCIUM CHLORIDE: 600; 310; 30; 20 INJECTION, SOLUTION INTRAVENOUS at 01:09

## 2023-09-13 NOTE — ED NOTES
Right NGT placed successfully. Patient in pain (10/10 on pain scale) and gagging from nausea. 400 ml brown emesis instantly removed from stomach by suction. Patient spouse at the bedside. HOB remained elevated. Will order xray.

## 2023-09-13 NOTE — ED NOTES
"Patient skin warm and clammy. Complaints of feeling "warm". VS stable on monitor. Glucose 164. O2 2L applied by nasal canula.   "

## 2023-09-13 NOTE — H&P
"[facility] - Emergency Department  General Surgery  History & Physical    Patient Name: Mike Mims  MRN: [id number]  Admission Date: 9/12/2023  Attending Physician: Lorenzo Velasco MD   Primary Care Provider: Ankit Lopes III, DO    Patient information was obtained from patient and ER records.     Subjective:     Chief Complaint/Reason for Admission: abdominal pain    History of Present Illness:  Patient is a 48 y.o. male presents with 1 day history of abdominal pain nausea vomiting.  He did have some watery bowel movement this morning and still passing occasional flatus.  Previous operations includes a umbilical hernia repair and a cholecystectomy.  CT scan showed obstructive symptoms.  Diabetic with medical conditions.    No current facility-administered medications on file prior to encounter.     Current Outpatient Medications on File Prior to Encounter   Medication Sig    ascorbic acid, vitamin C, (VITAMIN C) 500 MG tablet Take 500 mg by mouth once daily.    aspirin (ECOTRIN) 81 MG EC tablet Take 81 mg by mouth once daily.    atorvastatin (LIPITOR) 80 MG tablet TAKE 1 TABLET BY MOUTH EVERY DAY    celecoxib (CELEBREX) 200 MG capsule Take 1 capsule (200 mg total) by mouth 2 (two) times daily.    dapagliflozin-metformin (XIGDUO XR) 5-1,000 mg Take 1 tablet by mouth 2 (two) times a day.    HYDROcodone-acetaminophen (NORCO)  mg per tablet Take 1 tablet by mouth every 6 (six) hours as needed for Pain.    insulin detemir U-100 (LEVEMIR) 100 unit/mL injection Inject 50 Units into the skin once daily.    insulin syringe-needle U-100 (BD VEO INSULIN SYRINGE UF) 1 mL 31 gauge x 15/64" Syrg USE 3 TIMES A DAY TO INJECT INSULIN Strength: 1 mL 31 gauge x 15/64"    lisinopriL (PRINIVIL,ZESTRIL) 5 MG tablet Take 1 tablet (5 mg total) by mouth once daily.    pantoprazole (PROTONIX) 40 MG tablet Take 1 tablet (40 mg total) by mouth once daily.    SITagliptin (JANUVIA) 100 MG Tab Take 1 tablet (100 mg " total) by mouth once daily.    vitamin D (VITAMIN D3) 1000 units Tab Take 1,000 Units by mouth 2 (two) times a day.       Review of patient's allergies indicates:  No Known Allergies    Past Medical History:   Diagnosis Date    Diabetes mellitus     Diabetes mellitus, type 2     HTN (hypertension)     Hyperlipidemia      Past Surgical History:   Procedure Laterality Date    CHOLECYSTECTOMY      HERNIA REPAIR      ROTATOR CUFF REPAIR       Family History       Problem Relation (Age of Onset)    Depression Sister    Diabetes Mother, Father, Sister, Brother, Sister    No Known Problems Sister    Prostate cancer Father          Tobacco Use    Smoking status: Never    Smokeless tobacco: Never   Substance and Sexual Activity    Alcohol use: Yes     Comment: occasional liquor    Drug use: Never    Sexual activity: Yes     Review of Systems   Constitutional:  Negative for activity change, appetite change, fatigue and fever.   HENT:  Negative for trouble swallowing.    Respiratory:  Negative for cough and shortness of breath.    Cardiovascular:  Negative for chest pain and palpitations.   Gastrointestinal:  Positive for abdominal pain, nausea and vomiting. Negative for abdominal distention, blood in stool, constipation and diarrhea.   Genitourinary:  Negative for flank pain.   Musculoskeletal:  Negative for neck pain and neck stiffness.   Neurological:  Negative for weakness.     Objective:     Vital Signs (Most Recent):  Temp: 97.6 °F (36.4 °C) (09/12/23 2121)  Pulse: 95 (09/13/23 0330)  Resp: 18 (09/13/23 0652)  BP: (!) 167/86 (09/13/23 0330)  SpO2: 97 % (09/13/23 0330) Vital Signs (24h Range):  Temp:  [97.6 °F (36.4 °C)] 97.6 °F (36.4 °C)  Pulse:  [] 95  Resp:  [11-23] 18  SpO2:  [92 %-97 %] 97 %  BP: (138-180)/() 167/86     Weight: 95.3 kg (210 lb)  Body mass index is 31.01 kg/m².    Physical Exam  Constitutional:       General: He is not in acute distress.  HENT:      Head: Normocephalic.   Cardiovascular:       Rate and Rhythm: Normal rate and regular rhythm.      Pulses: Normal pulses.   Pulmonary:      Effort: Pulmonary effort is normal. No respiratory distress.      Breath sounds: Normal breath sounds.   Abdominal:      General: Abdomen is flat. There is no distension.      Palpations: Abdomen is soft.      Tenderness: There is no abdominal tenderness.   Musculoskeletal:         General: Normal range of motion.   Skin:     General: Skin is warm.   Neurological:      General: No focal deficit present.      Mental Status: He is oriented to person, place, and time.         Significant Labs:  I have reviewed all pertinent lab results within the past 24 hours.  CMP:   Recent Labs   Lab 09/12/23  2130   *   CALCIUM 9.2   ALBUMIN 4.0   PROT 7.7      K 3.9   CO2 26      BUN 13   CREATININE 1.12   ALKPHOS 96   ALT 31   AST 17   BILITOT 0.7       Significant Diagnostics:  I have reviewed all pertinent imaging results/findings within the past 24 hours.  CT: I have reviewed all pertinent results/findings within the past 24 hours and my personal findings are:  SBO    Assessment/Plan:     There are no hospital problems to display for this patient.    VTE Risk Mitigation (From admission, onward)           Ordered     IP VTE HIGH RISK PATIENT  Once         09/13/23 0103     Place sequential compression device  Until discontinued         09/13/23 0103                  Likely small-bowel obstruction.  Conservative treatment with NPO, IV fluids, bowel rest and will do order a Gastrografin challenge just morning.  All questions were answered.      Lorenzo Velasco MD  General Surgery  [facility] - Emergency Department

## 2023-09-13 NOTE — ED NOTES
Patient resting with eyes closed. Patient easily awakened. Respirations even and non labored. Denies pain. VS stable. 800 ml of brown emesis in suction canister. Suction on LIWS. Call bell in reach. Will monitor.

## 2023-09-13 NOTE — ED PROVIDER NOTES
Encounter Date: 9/12/2023    SCRIBE #1 NOTE: I, Loly Sepulveda, am scribing for, and in the presence of,  Abhi Cavanaugh MD. I have scribed the entire note.       History     Chief Complaint   Patient presents with    Abdominal Pain     Upper abd pain - bilateral upper quadrants - onset was 1200 today     This is a 49 y/o male,who presents to the ED with complaints of abdominal pain which started 1230 today. He states he noticed a small pain today while at work and thought it is was due to him not eating, but once he ate the pain worsened. Pt denies a fever, but notes nausea and vomiting. He states he has not passed gas today but he has had a BM. He notes he has had a hernia surgery and a cholecystectomy. There are no other complaints/pain in the ED at this time. He has a known hx of HTN, hyperlipidemia, and diabetes. There is no smoking hx.     The history is provided by the patient. No  was used.     Review of patient's allergies indicates:  No Known Allergies  Past Medical History:   Diagnosis Date    Diabetes mellitus     Diabetes mellitus, type 2     HTN (hypertension)     Hyperlipidemia      Past Surgical History:   Procedure Laterality Date    CHOLECYSTECTOMY      HERNIA REPAIR      ROTATOR CUFF REPAIR       Family History   Problem Relation Age of Onset    Diabetes Mother     Diabetes Father     Prostate cancer Father     Diabetes Sister     Diabetes Brother     Diabetes Sister     Depression Sister     No Known Problems Sister      Social History     Tobacco Use    Smoking status: Never    Smokeless tobacco: Never   Substance Use Topics    Alcohol use: Yes     Comment: occasional liquor    Drug use: Never     Review of Systems   Constitutional:  Negative for fever.   Gastrointestinal:  Positive for abdominal pain, nausea and vomiting. Negative for diarrhea.   All other systems reviewed and are negative.      Physical Exam     Initial Vitals [09/12/23 2121]   BP Pulse Resp Temp  SpO2   (!) 141/82 98 18 97.6 °F (36.4 °C) 97 %      MAP       --         Physical Exam    Nursing note and vitals reviewed.  Constitutional: He appears well-developed and well-nourished.   HENT:   Head: Normocephalic and atraumatic.   Eyes: EOM are normal. Pupils are equal, round, and reactive to light.   Neck: Neck supple. No thyromegaly present.   Normal range of motion.  Cardiovascular:  Normal rate, regular rhythm, normal heart sounds and intact distal pulses.           No murmur heard.  Pulmonary/Chest: Breath sounds normal. No respiratory distress. He has no wheezes.   Abdominal: Abdomen is soft. Bowel sounds are normal. He exhibits distension. There is abdominal tenderness (LUQ tenderness.). There is guarding (Some guarding on exam.).   Musculoskeletal:         General: No tenderness or edema. Normal range of motion.      Cervical back: Normal range of motion and neck supple.     Lymphadenopathy:     He has no cervical adenopathy.   Neurological: He is alert and oriented to person, place, and time. He has normal strength and normal reflexes. No cranial nerve deficit or sensory deficit. GCS score is 15. GCS eye subscore is 4. GCS verbal subscore is 5. GCS motor subscore is 6.   Skin: Skin is warm and dry. Capillary refill takes less than 2 seconds. No rash noted.   Psychiatric: He has a normal mood and affect.         ED Course   Procedures  Labs Reviewed   COMPREHENSIVE METABOLIC PANEL - Abnormal; Notable for the following components:       Result Value    Glucose 130 (*)     All other components within normal limits   URINALYSIS, REFLEX TO URINE CULTURE - Abnormal; Notable for the following components:    Protein, UA 10 (*)     Ketones, UA 20 (*)     Specific Gravity, UA >1.050 (*)     All other components within normal limits   LIPASE - Abnormal; Notable for the following components:    Lipase 36 (*)     All other components within normal limits   CBC WITH DIFFERENTIAL - Abnormal; Notable for the following  components:    RDW 14.6 (*)     Neutrophils % 68.5 (*)     Lymphocytes % 20.3 (*)     Monocytes % 9.5 (*)     Monocytes, Absolute 0.87 (*)     All other components within normal limits   HEMOGLOBIN A1C - Abnormal; Notable for the following components:    Hemoglobin A1C 7.7 (*)     All other components within normal limits   POCT GLUCOSE MONITORING CONTINUOUS - Abnormal; Notable for the following components:    POC Glucose 164 (*)     All other components within normal limits   SARS-COV-2 RNA AMPLIFICATION, QUAL - Normal    Narrative:     Negative SARS-CoV results should not be used as the sole basis for treatment or patient management decisions; negative results should be considered in the context of a patient's recent exposures, history and the presene of clinical signs and symptoms consistent with COVID-19.  Negative results should be treated as presumptive and confirmed by molecular assay, if necessary for patient management.   CBC W/ AUTO DIFFERENTIAL    Narrative:     The following orders were created for panel order CBC auto differential.  Procedure                               Abnormality         Status                     ---------                               -----------         ------                     CBC with Differential[[id number]]       Abnormal            Final result                 Please view results for these tests on the individual orders.   POCT GLUCOSE MONITORING CONTINUOUS     EKG Readings: (Independently Interpreted)   Heart Rate: 91.   Interpreted by Dr. Cavanaugh;   Sinus Rhythm       Imaging Results              XR Gastrograffin Challenge (Final result)  Result time 09/13/23 15:32:28      Final result by Zach Bowers MD (09/13/23 15:32:28)                   Impression:      Contrast material has traversed the entire colon at 06:00 hours      Electronically signed by: Zach Bowers  Date:    09/13/2023  Time:    15:32               Narrative:    EXAMINATION:  XR GASTROGRAFFIN  CHALLENGE    CLINICAL HISTORY:  Small bowel obstruction    COMPARISON:  No previous similar    TECHNIQUE:  Initially,  KUB was obtained.  Following the injection of dilute Gastrografin into the nasogastric tube, 5 minute and 6 hour overhead films were obtained.    58 seconds fluoroscopy time was utilized.  Nine images are archived.    FINDINGS:  The  film shows some mildly disproportionate small-bowel distention in the upper central abdomen.    The 5 minute film demonstrates contrast material within the stomach, duodenum, and proximal jejunum.    The 6 hour overhead film demonstrates that contrast material has traversed the mid to distal small bowel.  There is a small amount of residual contrast at the terminal ileum level.  Contrast material traverses the entire colon and reaches the rectum.                                       XR Gastric tube check, non-radiologist performed (Final result)  Result time 09/13/23 07:50:56      Final result by Dakota Ray MD (09/13/23 07:50:56)                   Impression:      Enteric tube over the stomach      Electronically signed by: Dakota Ray  Date:    09/13/2023  Time:    07:50               Narrative:    EXAMINATION:  XR GASTRIC TUBE CHECK, NON-RADIOLOGIST PERFORMED    CLINICAL HISTORY:  ngt;    TECHNIQUE:  AP view of the lower chest and upper abdomen for enteric tube localization.    COMPARISON:  None    FINDINGS:  Enteric tube over the stomach                                       CT Abdomen Pelvis With Contrast (Final result)  Result time 09/13/23 07:44:15      Final result by Dakota Ray MD (09/13/23 07:44:15)                   Impression:      Findings compatible with small-bowel obstruction with transition point right lower quadrant.      Electronically signed by: Dakota Ray  Date:    09/13/2023  Time:    07:44               Narrative:    EXAMINATION:  CT ABDOMEN PELVIS WITH CONTRAST    CLINICAL HISTORY:  Bowel obstruction suspected;    TECHNIQUE:  Low  dose axial images, sagittal and coronal reformations were obtained from the lung bases to the pubic symphysis following the IV administration of 100 mL of Isovue 370 .  Oral contrast was not given.    The CT examination was performed using one or more of the following dose reduction techniques: Automated exposure control, adjustment of the mA and kV according to patient's size, use of acute or iterative reconstruction techniques.    COMPARISON:  03/18/2017    FINDINGS:  The lung bases are clear.    Liver unremarkable.  Gallbladder absent.  Adrenals, pancreas, and spleen appear normal.  Nonobstructing punctate stone lower pole left kidney.  There is no hydronephrosis or hydroureter of either kidney.    No pneumoperitoneum.  Dilated proximal small bowel loops with what appears to be a transition point of the right lower quadrant.    Appendix is seen and normal.  Abdominal aorta and iliacs appear normal.  Urinary bladder unremarkable.  No adenopathy.    No acute fracture.                                       XR ABDOMEN, ACUTE 2 OR MORE VIEWS WITH CHEST (Final result)  Result time 09/13/23 07:50:53      Final result by Eriberto Flowers II, MD (09/13/23 07:50:53)                   Impression:      Distended small bowel with air-fluid levels could indicate obstruction.      Electronically signed by: Eriberto Flowers  Date:    09/13/2023  Time:    07:50               Narrative:    EXAMINATION:  XR ABDOMEN ACUTE 2 OR MORE VIEWS WITH CHEST    CLINICAL HISTORY:  Abdominal pain Abdominal pain;    COMPARISON:  18 July 2023    TECHNIQUE:  XR ABDOMEN 2 VIEWS WITH CHEST    FINDINGS:  No free fluid or free air seen.  Distended loops of small bowel present in the right upper abdomen with air-fluid levels.  Remaining bowel gas pattern appears within normal limits.  No abnormal calcifications are present. Chest shows no evidence of abnormality.  Clips present from previous surgeries no other abnormality is identified.                                     X-Rays:   Independently Interpreted Readings:   Abdomen: There are multiple air-fluid levels indictating a small bowel obstruction.   Other Readings:  CT Abdomen pelvis with contrast:   1. There is no hydronephrosis or perinephric fat stranding seen.   2. Mild to moderate grade small bowel obstruction is seen with transition in the right mid abdomen. Oral up recommended.     Medications   sodium chloride 0.9% flush 10 mL (has no administration in time range)   melatonin tablet 6 mg (has no administration in time range)   acetaminophen suppository 650 mg (has no administration in time range)   morphine injection 2 mg (has no administration in time range)   HYDROmorphone (PF) injection 1 mg (1 mg Intravenous Given 9/13/23 0652)   ondansetron injection 4 mg (4 mg Intravenous Given 9/13/23 0120)   prochlorperazine injection Soln 5 mg (5 mg Intravenous Given 9/13/23 0652)   lactated ringers infusion ( Intravenous Restarted 9/13/23 1837)   enoxaparin injection 40 mg (40 mg Subcutaneous Given 9/13/23 1848)   glucagon (human recombinant) injection 1 mg (has no administration in time range)   insulin aspart U-100 injection 0-10 Units (has no administration in time range)   dextrose 10% bolus 125 mL 125 mL (has no administration in time range)   dextrose 10% bolus 250 mL 250 mL (has no administration in time range)   lactated ringers bolus 1,000 mL (0 mLs Intravenous Stopped 9/12/23 2318)   ondansetron injection 4 mg (4 mg Intravenous Given 9/12/23 2239)   morphine injection 2 mg (2 mg Intravenous Given 9/12/23 2239)   iopamidoL (ISOVUE-370) injection 100 mL (100 mLs Intravenous Given 9/12/23 2238)   morphine injection 2 mg (2 mg Intravenous Given 9/13/23 0054)   diatrizoate meglumineand-diatrizoate sodium (GASTROVIEW) solution 120 mL (120 mLs Per NG tube Given 9/13/23 0942)     Medical Decision Making  presents to the ED with complaints of abdominal pain which started 1230 today. He states he noticed  a small pain today while at work and thought it is was due to him not eating, but once he ate the pain worsened. Pt denies a fever, but notes nausea and vomiting. He states he has not passed gas today but he has had a BM. He notes he has had a hernia surgery and a cholecystectomy.  Physical examination revealed uncomfortable male patient with distended abdomen and hyperactive bowel sounds.  There was tenderness in the left upper quadrant.  I feel the patient has symptoms of acute bowel obstruction.  The told me that he has history of cholecystectomy and surgery for his inguinal hernia    Amount and/or Complexity of Data Reviewed  Labs: ordered. Decision-making details documented in ED Course.  Radiology: ordered and independent interpretation performed. Decision-making details documented in ED Course.  ECG/medicine tests: ordered and independent interpretation performed. Decision-making details documented in ED Course.  Discussion of management or test interpretation with external provider(s): 0046: Dr. Cavanaugh calls Dr. Velasco, the on call surgeron, to discuss the pt's case and possible admissions. Dr. Velasco accepts the pt at this time.     Risk  OTC drugs.  Prescription drug management.              Attending Attestation:           Physician Attestation for Scribe:  Physician Attestation Statement for Scribe #1: I, Abhi Cavanaugh MD, reviewed documentation, as scribed by Loly Sepulveda in my presence, and it is both accurate and complete.             ED Course as of 09/13/23 2344   Wed Sep 13, 2023   0044 CT Abdomen pelvis with contrast:   1. There is no hydronephrosis or perinephric fat stranding seen.   2. Mild to moderate grade small bowel obstruction is seen with transition in the right mid abdomen. Oral up recommended.  [BW]      ED Course User Index  [BW] Loly Sepulveda                    Clinical Impression:   Final diagnoses:  [R10.9] Abdominal pain  [K56.609] SBO (small bowel obstruction)  (Primary)        ED Disposition Condition    Observation                 Abhi Cavanaugh MD  09/13/23 9428

## 2023-09-14 VITALS
WEIGHT: 210 LBS | SYSTOLIC BLOOD PRESSURE: 127 MMHG | RESPIRATION RATE: 24 BRPM | OXYGEN SATURATION: 95 % | HEIGHT: 69 IN | TEMPERATURE: 100 F | BODY MASS INDEX: 31.1 KG/M2 | HEART RATE: 96 BPM | DIASTOLIC BLOOD PRESSURE: 85 MMHG

## 2023-09-14 PROBLEM — K56.609 SBO (SMALL BOWEL OBSTRUCTION): Status: ACTIVE | Noted: 2023-09-14

## 2023-09-14 LAB
ANION GAP SERPL CALCULATED.3IONS-SCNC: 8 MMOL/L (ref 7–16)
BUN SERPL-MCNC: 14 MG/DL (ref 7–18)
BUN/CREAT SERPL: 14 (ref 6–20)
CALCIUM SERPL-MCNC: 8.6 MG/DL (ref 8.5–10.1)
CHLORIDE SERPL-SCNC: 111 MMOL/L (ref 98–107)
CO2 SERPL-SCNC: 26 MMOL/L (ref 21–32)
CREAT SERPL-MCNC: 0.98 MG/DL (ref 0.7–1.3)
EGFR (NO RACE VARIABLE) (RUSH/TITUS): 95 ML/MIN/1.73M2
GLUCOSE SERPL-MCNC: 158 MG/DL (ref 70–105)
GLUCOSE SERPL-MCNC: 176 MG/DL (ref 70–105)
GLUCOSE SERPL-MCNC: 178 MG/DL (ref 70–105)
GLUCOSE SERPL-MCNC: 191 MG/DL (ref 74–106)
POTASSIUM SERPL-SCNC: 3.8 MMOL/L (ref 3.5–5.1)
SODIUM SERPL-SCNC: 141 MMOL/L (ref 136–145)

## 2023-09-14 PROCEDURE — 96376 TX/PRO/DX INJ SAME DRUG ADON: CPT

## 2023-09-14 PROCEDURE — 82962 GLUCOSE BLOOD TEST: CPT

## 2023-09-14 PROCEDURE — 63600175 PHARM REV CODE 636 W HCPCS: Performed by: EMERGENCY MEDICINE

## 2023-09-14 PROCEDURE — G0378 HOSPITAL OBSERVATION PER HR: HCPCS

## 2023-09-14 PROCEDURE — 96361 HYDRATE IV INFUSION ADD-ON: CPT

## 2023-09-14 PROCEDURE — 80048 BASIC METABOLIC PNL TOTAL CA: CPT | Performed by: SURGERY

## 2023-09-14 PROCEDURE — 99238 HOSP IP/OBS DSCHRG MGMT 30/<: CPT | Mod: ,,, | Performed by: SURGERY

## 2023-09-14 PROCEDURE — 99238 PR HOSPITAL DISCHARGE DAY,<30 MIN: ICD-10-PCS | Mod: ,,, | Performed by: SURGERY

## 2023-09-14 RX ADMIN — MORPHINE SULFATE 2 MG: 2 INJECTION, SOLUTION INTRAMUSCULAR; INTRAVENOUS at 03:09

## 2023-09-14 RX ADMIN — SODIUM CHLORIDE, POTASSIUM CHLORIDE, SODIUM LACTATE AND CALCIUM CHLORIDE: 600; 310; 30; 20 INJECTION, SOLUTION INTRAVENOUS at 06:09

## 2023-09-14 NOTE — DISCHARGE SUMMARY
[facility]  Discharge Note  Short Stay    * No surgery found *      OUTCOME: Condition has improved and patient is now ready for discharge.  Patient admitted for small-bowel obstruction.  Started on a Gastrografin challenge which was negative had copious amounts of bowel movements.  Discharge day he is tolerating a diet with no nausea vomiting and was still having bowel movements and opened up.  Felt comfortable going home.      DISPOSITION: Home or Self Care    FINAL DIAGNOSIS:  SBO (small bowel obstruction)    FOLLOWUP: With primary care provider    DISCHARGE INSTRUCTIONS:    Discharge Procedure Orders   Diet Adult Regular     Remove dressing in 24 hours     Notify your health care provider if you experience any of the following:  temperature >100.4     Notify your health care provider if you experience any of the following:  persistent nausea and vomiting or diarrhea     Notify your health care provider if you experience any of the following:  severe uncontrolled pain     Notify your health care provider if you experience any of the following:  redness, tenderness, or signs of infection (pain, swelling, redness, odor or green/yellow discharge around incision site)     Notify your health care provider if you experience any of the following:  difficulty breathing or increased cough     Notify your health care provider if you experience any of the following:  severe persistent headache     Notify your health care provider if you experience any of the following:  worsening rash     Notify your health care provider if you experience any of the following:  persistent dizziness, light-headedness, or visual disturbances     Notify your health care provider if you experience any of the following:  increased confusion or weakness     Notify your health care provider if you experience any of the following:     Shower on day dressing removed (No bath)        TIME SPENT ON DISCHARGE: 10 minutes

## 2023-09-14 NOTE — PLAN OF CARE
[facility]  Discharge Final Note    Primary Care Provider: Ankit Lopes III, DO    Expected Discharge Date: 9/14/2023    Final Discharge Note (most recent)       Final Note - 09/14/23 1037          Final Note    Assessment Type Final Discharge Note     Anticipated Discharge Disposition Home or Self Care     What phone number can be called within the next 1-3 days to see how you are doing after discharge? [phone]        Post-Acute Status    Discharge Delays None known at this time                     Pt to dc home with self care on this day. Chart reviewed. 0 dc needs noted.

## 2023-09-20 RX ORDER — HYDROCODONE BITARTRATE AND ACETAMINOPHEN 10; 325 MG/1; MG/1
1 TABLET ORAL EVERY 6 HOURS PRN
Qty: 30 TABLET | Refills: 0 | Status: SHIPPED | OUTPATIENT
Start: 2023-09-20 | End: 2023-11-15 | Stop reason: SDUPTHER

## 2023-09-25 ENCOUNTER — OFFICE VISIT (OUTPATIENT)
Dept: PRIMARY CARE CLINIC | Facility: CLINIC | Age: 48
End: 2023-09-25
Payer: COMMERCIAL

## 2023-09-25 VITALS
SYSTOLIC BLOOD PRESSURE: 138 MMHG | DIASTOLIC BLOOD PRESSURE: 72 MMHG | WEIGHT: 214 LBS | BODY MASS INDEX: 31.7 KG/M2 | OXYGEN SATURATION: 98 % | HEART RATE: 88 BPM | HEIGHT: 69 IN | RESPIRATION RATE: 18 BRPM

## 2023-09-25 DIAGNOSIS — E11.01 TYPE 2 DIABETES MELLITUS WITH HYPEROSMOLAR COMA, WITH LONG-TERM CURRENT USE OF INSULIN: ICD-10-CM

## 2023-09-25 DIAGNOSIS — E78.5 HYPERLIPIDEMIA, UNSPECIFIED HYPERLIPIDEMIA TYPE: ICD-10-CM

## 2023-09-25 DIAGNOSIS — K56.609 SBO (SMALL BOWEL OBSTRUCTION): ICD-10-CM

## 2023-09-25 DIAGNOSIS — I10 PRIMARY HYPERTENSION: Primary | ICD-10-CM

## 2023-09-25 DIAGNOSIS — Z79.4 TYPE 2 DIABETES MELLITUS WITH HYPEROSMOLAR COMA, WITH LONG-TERM CURRENT USE OF INSULIN: ICD-10-CM

## 2023-09-25 DIAGNOSIS — M62.830 BACK MUSCLE SPASM: ICD-10-CM

## 2023-09-25 PROCEDURE — 3075F PR MOST RECENT SYSTOLIC BLOOD PRESS GE 130-139MM HG: ICD-10-PCS | Mod: ,,, | Performed by: FAMILY MEDICINE

## 2023-09-25 PROCEDURE — 4010F ACE/ARB THERAPY RXD/TAKEN: CPT | Mod: ,,, | Performed by: FAMILY MEDICINE

## 2023-09-25 PROCEDURE — 99214 OFFICE O/P EST MOD 30 MIN: CPT | Mod: ,,, | Performed by: FAMILY MEDICINE

## 2023-09-25 PROCEDURE — 1159F PR MEDICATION LIST DOCUMENTED IN MEDICAL RECORD: ICD-10-PCS | Mod: ,,, | Performed by: FAMILY MEDICINE

## 2023-09-25 PROCEDURE — 3008F BODY MASS INDEX DOCD: CPT | Mod: ,,, | Performed by: FAMILY MEDICINE

## 2023-09-25 PROCEDURE — 3078F DIAST BP <80 MM HG: CPT | Mod: ,,, | Performed by: FAMILY MEDICINE

## 2023-09-25 PROCEDURE — 4010F PR ACE/ARB THEARPY RXD/TAKEN: ICD-10-PCS | Mod: ,,, | Performed by: FAMILY MEDICINE

## 2023-09-25 PROCEDURE — 3075F SYST BP GE 130 - 139MM HG: CPT | Mod: ,,, | Performed by: FAMILY MEDICINE

## 2023-09-25 PROCEDURE — 3008F PR BODY MASS INDEX (BMI) DOCUMENTED: ICD-10-PCS | Mod: ,,, | Performed by: FAMILY MEDICINE

## 2023-09-25 PROCEDURE — 3078F PR MOST RECENT DIASTOLIC BLOOD PRESSURE < 80 MM HG: ICD-10-PCS | Mod: ,,, | Performed by: FAMILY MEDICINE

## 2023-09-25 PROCEDURE — 3051F HG A1C>EQUAL 7.0%<8.0%: CPT | Mod: ,,, | Performed by: FAMILY MEDICINE

## 2023-09-25 PROCEDURE — 1159F MED LIST DOCD IN RCRD: CPT | Mod: ,,, | Performed by: FAMILY MEDICINE

## 2023-09-25 PROCEDURE — 3051F PR MOST RECENT HEMOGLOBIN A1C LEVEL 7.0 - < 8.0%: ICD-10-PCS | Mod: ,,, | Performed by: FAMILY MEDICINE

## 2023-09-25 PROCEDURE — 99214 PR OFFICE/OUTPT VISIT, EST, LEVL IV, 30-39 MIN: ICD-10-PCS | Mod: ,,, | Performed by: FAMILY MEDICINE

## 2023-09-25 NOTE — PROGRESS NOTES
"Subjective:      Patient ID: Mike Mims is a 48 y.o. male.    Chief Complaint: Follow-up (6mon. Ck-up) and Diabetes    Mike Mims a 48 y.o. male presents for follow up on all regular problems which are reviewed and discussed.     Problem List Items Addressed This Visit          Cardiac/Vascular    HTN (hypertension) - Primary    Hyperlipidemia       Endocrine    Type 2 diabetes mellitus, with long-term current use of insulin       GI    SBO (small bowel obstruction)       Orthopedic    Back muscle spasm       Past Medical History:  Past Medical History:   Diagnosis Date    Diabetes mellitus     Diabetes mellitus, type 2     HTN (hypertension)     Hyperlipidemia      Past Surgical History:   Procedure Laterality Date    CHOLECYSTECTOMY      HERNIA REPAIR      ROTATOR CUFF REPAIR       Review of patient's allergies indicates:  No Known Allergies  Current Outpatient Medications on File Prior to Visit   Medication Sig Dispense Refill    ascorbic acid, vitamin C, (VITAMIN C) 500 MG tablet Take 500 mg by mouth once daily.      aspirin (ECOTRIN) 81 MG EC tablet Take 81 mg by mouth once daily.      atorvastatin (LIPITOR) 80 MG tablet TAKE 1 TABLET BY MOUTH EVERY DAY 30 tablet 11    celecoxib (CELEBREX) 200 MG capsule Take 1 capsule (200 mg total) by mouth 2 (two) times daily. 60 capsule 5    dapagliflozin-metformin (XIGDUO XR) 5-1,000 mg Take 1 tablet by mouth 2 (two) times a day. 180 tablet 3    HYDROcodone-acetaminophen (NORCO)  mg per tablet Take 1 tablet by mouth every 6 (six) hours as needed for Pain. 30 tablet 0    insulin detemir U-100 (LEVEMIR) 100 unit/mL injection Inject 50 Units into the skin once daily. 45 mL 3    insulin syringe-needle U-100 (BD VEO INSULIN SYRINGE UF) 1 mL 31 gauge x 15/64" Syrg USE 3 TIMES A DAY TO INJECT INSULIN Strength: 1 mL 31 gauge x 15/64" 100 each 3    lisinopriL (PRINIVIL,ZESTRIL) 5 MG tablet Take 1 tablet (5 mg total) by mouth once daily. 90 tablet 3    pantoprazole " "(PROTONIX) 40 MG tablet Take 1 tablet (40 mg total) by mouth once daily. 90 tablet 3    SITagliptin (JANUVIA) 100 MG Tab Take 1 tablet (100 mg total) by mouth once daily. 90 tablet 3    vitamin D (VITAMIN D3) 1000 units Tab Take 1,000 Units by mouth 2 (two) times a day.       No current facility-administered medications on file prior to visit.     Social History     Socioeconomic History    Marital status:      Spouse name: andrei    Number of children: 3    Years of education: 16    Highest education level: Bachelor's degree (e.g., BA, AB, BS)   Tobacco Use    Smoking status: Never    Smokeless tobacco: Never   Substance and Sexual Activity    Alcohol use: Yes     Comment: occasional liquor    Drug use: Never    Sexual activity: Yes     Family History   Problem Relation Age of Onset    Diabetes Mother     Diabetes Father     Prostate cancer Father     Diabetes Sister     Diabetes Brother     Diabetes Sister     Depression Sister     No Known Problems Sister        Review of Systems   Constitutional: Negative.    HENT:  Negative for congestion, ear pain, nosebleeds and trouble swallowing.    Eyes:  Negative for pain and itching.   Respiratory:  Negative for chest tightness.    Cardiovascular:  Negative for chest pain.   Gastrointestinal:  Negative for abdominal distention.   Endocrine: Negative for cold intolerance and heat intolerance.   Genitourinary:  Negative for difficulty urinating.   Musculoskeletal:  Negative for arthralgias.   Neurological:  Negative for dizziness.       Objective:     /72 (BP Location: Left arm, Patient Position: Sitting, BP Method: Large (Manual))   Pulse 88   Resp 18   Ht 5' 9" (1.753 m)   Wt 97.1 kg (214 lb)   SpO2 98%   BMI 31.60 kg/m²     Physical Exam  Constitutional:       Appearance: Normal appearance. He is obese.   HENT:      Head: Normocephalic and atraumatic.      Right Ear: External ear normal.      Left Ear: External ear normal.      Nose: Nose normal.      " Mouth/Throat:      Mouth: Mucous membranes are moist.      Pharynx: Oropharynx is clear.   Eyes:      Pupils: Pupils are equal, round, and reactive to light.   Neck:      Vascular: No carotid bruit.   Cardiovascular:      Rate and Rhythm: Normal rate and regular rhythm.      Heart sounds: Normal heart sounds. No murmur heard.     No gallop.   Pulmonary:      Effort: Pulmonary effort is normal. No respiratory distress.      Breath sounds: Normal breath sounds. No wheezing or rales.   Abdominal:      Palpations: Abdomen is soft.   Musculoskeletal:         General: Normal range of motion.      Cervical back: Normal range of motion and neck supple.   Skin:     General: Skin is warm and dry.   Neurological:      General: No focal deficit present.      Mental Status: He is alert.   Psychiatric:         Mood and Affect: Mood normal.         Behavior: Behavior normal.         Thought Content: Thought content normal.         Judgment: Judgment normal.         1. Primary hypertension    2. Hyperlipidemia, unspecified hyperlipidemia type    3. Type 2 diabetes mellitus with hyperosmolar coma, with long-term current use of insulin    4. SBO (small bowel obstruction)    5. Back muscle spasm        Plan:     Problem List Items Addressed This Visit          Cardiac/Vascular    HTN (hypertension) - Primary    Hyperlipidemia       Endocrine    Type 2 diabetes mellitus, with long-term current use of insulin       GI    SBO (small bowel obstruction)       Orthopedic    Back muscle spasm     No follow-ups on file.  6m fu/lab    I am having Mike Mims maintain his vitamin D, aspirin, ascorbic acid (vitamin C), atorvastatin, SITagliptin phosphate, insulin detemir U-100, dapaglifloz propaned-metformin, insulin syringe-needle U-100, pantoprazole, lisinopriL, celecoxib, and HYDROcodone-acetaminophen.    Mike was seen today for follow-up and diabetes.    Diagnoses and all orders for this visit:    Primary hypertension    Hyperlipidemia,  unspecified hyperlipidemia type    Type 2 diabetes mellitus with hyperosmolar coma, with long-term current use of insulin    SBO (small bowel obstruction)    Back muscle spasm         [unfilled]  No orders of the defined types were placed in this encounter.

## 2023-10-02 RX ORDER — ATORVASTATIN CALCIUM 80 MG/1
TABLET, FILM COATED ORAL
Qty: 30 TABLET | Refills: 11 | Status: SHIPPED | OUTPATIENT
Start: 2023-10-02

## 2023-11-15 ENCOUNTER — HOSPITAL ENCOUNTER (OUTPATIENT)
Dept: RADIOLOGY | Facility: HOSPITAL | Age: 48
Discharge: HOME | End: 2023-11-15
Attending: FAMILY MEDICINE
Payer: COMMERCIAL

## 2023-11-15 ENCOUNTER — OFFICE VISIT (OUTPATIENT)
Dept: PRIMARY CARE CLINIC | Facility: CLINIC | Age: 48
End: 2023-11-15
Payer: COMMERCIAL

## 2023-11-15 ENCOUNTER — TELEPHONE (OUTPATIENT)
Dept: PRIMARY CARE CLINIC | Facility: CLINIC | Age: 48
End: 2023-11-15
Payer: COMMERCIAL

## 2023-11-15 VITALS
WEIGHT: 219.38 LBS | SYSTOLIC BLOOD PRESSURE: 140 MMHG | HEART RATE: 81 BPM | DIASTOLIC BLOOD PRESSURE: 90 MMHG | BODY MASS INDEX: 32.49 KG/M2 | OXYGEN SATURATION: 95 % | RESPIRATION RATE: 18 BRPM | HEIGHT: 69 IN

## 2023-11-15 DIAGNOSIS — Z23 FLU VACCINE NEED: ICD-10-CM

## 2023-11-15 DIAGNOSIS — R52 PAIN: ICD-10-CM

## 2023-11-15 DIAGNOSIS — Z00.00 ROUTINE GENERAL MEDICAL EXAMINATION AT A HEALTH CARE FACILITY: Primary | ICD-10-CM

## 2023-11-15 PROCEDURE — 3077F PR MOST RECENT SYSTOLIC BLOOD PRESSURE >= 140 MM HG: ICD-10-PCS | Mod: ,,, | Performed by: FAMILY MEDICINE

## 2023-11-15 PROCEDURE — 4010F PR ACE/ARB THEARPY RXD/TAKEN: ICD-10-PCS | Mod: ,,, | Performed by: FAMILY MEDICINE

## 2023-11-15 PROCEDURE — 72100 X-RAY EXAM L-S SPINE 2/3 VWS: CPT | Mod: 26,,, | Performed by: RADIOLOGY

## 2023-11-15 PROCEDURE — 72070 X-RAY EXAM THORAC SPINE 2VWS: CPT | Mod: TC

## 2023-11-15 PROCEDURE — 4010F ACE/ARB THERAPY RXD/TAKEN: CPT | Mod: ,,, | Performed by: FAMILY MEDICINE

## 2023-11-15 PROCEDURE — 90471 FLU VACCINE (QUAD) GREATER THAN OR EQUAL TO 3YO PRESERVATIVE FREE IM: ICD-10-PCS | Mod: ,,, | Performed by: FAMILY MEDICINE

## 2023-11-15 PROCEDURE — 99396 PREV VISIT EST AGE 40-64: CPT | Mod: 25,,, | Performed by: FAMILY MEDICINE

## 2023-11-15 PROCEDURE — 99396 PR PREVENTIVE VISIT,EST,40-64: ICD-10-PCS | Mod: 25,,, | Performed by: FAMILY MEDICINE

## 2023-11-15 PROCEDURE — 90686 IIV4 VACC NO PRSV 0.5 ML IM: CPT | Mod: ,,, | Performed by: FAMILY MEDICINE

## 2023-11-15 PROCEDURE — 72070 XR THORACIC SPINE AP LATERAL: ICD-10-PCS | Mod: 26,,, | Performed by: RADIOLOGY

## 2023-11-15 PROCEDURE — 72070 X-RAY EXAM THORAC SPINE 2VWS: CPT | Mod: 26,,, | Performed by: RADIOLOGY

## 2023-11-15 PROCEDURE — 3080F DIAST BP >= 90 MM HG: CPT | Mod: ,,, | Performed by: FAMILY MEDICINE

## 2023-11-15 PROCEDURE — 3051F HG A1C>EQUAL 7.0%<8.0%: CPT | Mod: ,,, | Performed by: FAMILY MEDICINE

## 2023-11-15 PROCEDURE — 90471 IMMUNIZATION ADMIN: CPT | Mod: ,,, | Performed by: FAMILY MEDICINE

## 2023-11-15 PROCEDURE — 3008F PR BODY MASS INDEX (BMI) DOCUMENTED: ICD-10-PCS | Mod: ,,, | Performed by: FAMILY MEDICINE

## 2023-11-15 PROCEDURE — 3080F PR MOST RECENT DIASTOLIC BLOOD PRESSURE >= 90 MM HG: ICD-10-PCS | Mod: ,,, | Performed by: FAMILY MEDICINE

## 2023-11-15 PROCEDURE — 71046 X-RAY EXAM CHEST 2 VIEWS: CPT | Mod: 26,,, | Performed by: RADIOLOGY

## 2023-11-15 PROCEDURE — 3008F BODY MASS INDEX DOCD: CPT | Mod: ,,, | Performed by: FAMILY MEDICINE

## 2023-11-15 PROCEDURE — 1159F MED LIST DOCD IN RCRD: CPT | Mod: ,,, | Performed by: FAMILY MEDICINE

## 2023-11-15 PROCEDURE — 1159F PR MEDICATION LIST DOCUMENTED IN MEDICAL RECORD: ICD-10-PCS | Mod: ,,, | Performed by: FAMILY MEDICINE

## 2023-11-15 PROCEDURE — 3051F PR MOST RECENT HEMOGLOBIN A1C LEVEL 7.0 - < 8.0%: ICD-10-PCS | Mod: ,,, | Performed by: FAMILY MEDICINE

## 2023-11-15 PROCEDURE — 3077F SYST BP >= 140 MM HG: CPT | Mod: ,,, | Performed by: FAMILY MEDICINE

## 2023-11-15 PROCEDURE — 72100 XR LUMBAR SPINE AP AND LATERAL: ICD-10-PCS | Mod: 26,,, | Performed by: RADIOLOGY

## 2023-11-15 PROCEDURE — 71046 X-RAY EXAM CHEST 2 VIEWS: CPT | Mod: TC

## 2023-11-15 PROCEDURE — 71046 XR CHEST PA AND LATERAL: ICD-10-PCS | Mod: 26,,, | Performed by: RADIOLOGY

## 2023-11-15 PROCEDURE — 90686 FLU VACCINE (QUAD) GREATER THAN OR EQUAL TO 3YO PRESERVATIVE FREE IM: ICD-10-PCS | Mod: ,,, | Performed by: FAMILY MEDICINE

## 2023-11-15 PROCEDURE — 72100 X-RAY EXAM L-S SPINE 2/3 VWS: CPT | Mod: TC

## 2023-11-15 RX ORDER — DAPAGLIFLOZIN AND METFORMIN HYDROCHLORIDE 5; 1000 MG/1; MG/1
1 TABLET, FILM COATED, EXTENDED RELEASE ORAL 2 TIMES DAILY
Qty: 180 TABLET | Refills: 3 | Status: SHIPPED | OUTPATIENT
Start: 2023-11-15 | End: 2023-11-16

## 2023-11-15 RX ORDER — HYDROCODONE BITARTRATE AND ACETAMINOPHEN 10; 325 MG/1; MG/1
1 TABLET ORAL EVERY 6 HOURS PRN
Qty: 30 TABLET | Refills: 0 | Status: SHIPPED | OUTPATIENT
Start: 2023-11-15 | End: 2024-02-27

## 2023-11-15 RX ORDER — METHOCARBAMOL 750 MG/1
1500 TABLET, FILM COATED ORAL 4 TIMES DAILY
Qty: 80 TABLET | Refills: 5 | Status: SHIPPED | OUTPATIENT
Start: 2023-11-15 | End: 2023-11-25

## 2023-11-15 RX ORDER — CELECOXIB 200 MG/1
200 CAPSULE ORAL 2 TIMES DAILY
Qty: 60 CAPSULE | Refills: 5 | Status: SHIPPED | OUTPATIENT
Start: 2023-11-15

## 2023-11-15 NOTE — TELEPHONE ENCOUNTER
----- Message from Ankit Lopes III DO sent at 11/15/2023 12:49 PM CST -----  Bone spurs and geoffrey

## 2023-11-15 NOTE — PROGRESS NOTES
"Subjective:      Patient ID: Mike Mims is a 48 y.o. male.    Chief Complaint: Hypertension and Back Pain    Mike arreola 48 y.o. male presents for follow up on all regular problems which are reviewed and discussed.   backache  Problem List Items Addressed This Visit          ID    Flu vaccine need    Relevant Orders    Influenza - Quadrivalent *Preferred* (6 months+) (PF) (Completed)       Other    Pain    Relevant Orders    X-Ray Chest PA And Lateral (Completed)    X-Ray Thoracic Spine AP Lateral (Completed)    X-Ray Lumbar Spine AP And Lateral (Completed)    RESOLVED: Routine general medical examination at a health care facility - Primary       Past Medical History:  Past Medical History:   Diagnosis Date    Diabetes mellitus     Diabetes mellitus, type 2     HTN (hypertension)     Hyperlipidemia      Past Surgical History:   Procedure Laterality Date    CHOLECYSTECTOMY      HERNIA REPAIR      ROTATOR CUFF REPAIR       Review of patient's allergies indicates:  No Known Allergies  Current Outpatient Medications on File Prior to Visit   Medication Sig Dispense Refill    ascorbic acid, vitamin C, (VITAMIN C) 500 MG tablet Take 500 mg by mouth once daily.      aspirin (ECOTRIN) 81 MG EC tablet Take 81 mg by mouth once daily.      atorvastatin (LIPITOR) 80 MG tablet TAKE 1 TABLET BY MOUTH EVERY DAY 30 tablet 11    celecoxib (CELEBREX) 200 MG capsule Take 1 capsule (200 mg total) by mouth 2 (two) times daily. 60 capsule 5    dapagliflozin-metformin (XIGDUO XR) 5-1,000 mg Take 1 tablet by mouth 2 (two) times a day. 180 tablet 3    HYDROcodone-acetaminophen (NORCO)  mg per tablet Take 1 tablet by mouth every 6 (six) hours as needed for Pain. 30 tablet 0    insulin detemir U-100 (LEVEMIR U-100 INSULIN) 100 unit/mL injection INJECT 50 UNITS INTO THE SKIN ONCE DAILY. 20 mL 3    insulin syringe-needle U-100 (BD VEO INSULIN SYRINGE UF) 1 mL 31 gauge x 15/64" Syrg USE 3 TIMES A DAY TO INJECT INSULIN Strength: 1 mL " "31 gauge x 15/64" 100 each 3    lisinopriL (PRINIVIL,ZESTRIL) 5 MG tablet Take 1 tablet (5 mg total) by mouth once daily. 90 tablet 3    pantoprazole (PROTONIX) 40 MG tablet Take 1 tablet (40 mg total) by mouth once daily. 90 tablet 3    vitamin D (VITAMIN D3) 1000 units Tab Take 1,000 Units by mouth 2 (two) times a day.      SITagliptin (JANUVIA) 100 MG Tab Take 1 tablet (100 mg total) by mouth once daily. 90 tablet 3     No current facility-administered medications on file prior to visit.     Social History     Socioeconomic History    Marital status:      Spouse name: andrei    Number of children: 3    Years of education: 16    Highest education level: Bachelor's degree (e.g., BA, AB, BS)   Tobacco Use    Smoking status: Never    Smokeless tobacco: Never   Substance and Sexual Activity    Alcohol use: Yes     Comment: occasional liquor    Drug use: Never    Sexual activity: Yes     Family History   Problem Relation Age of Onset    Diabetes Mother     Diabetes Father     Prostate cancer Father     Diabetes Sister     Diabetes Brother     Diabetes Sister     Depression Sister     No Known Problems Sister        Review of Systems   Constitutional: Negative.    HENT:  Negative for congestion, ear pain, nosebleeds and trouble swallowing.    Eyes:  Negative for pain and itching.   Respiratory:  Negative for chest tightness.    Cardiovascular:  Negative for chest pain.   Gastrointestinal:  Negative for abdominal distention.   Endocrine: Negative for cold intolerance and heat intolerance.   Genitourinary:  Negative for difficulty urinating.   Musculoskeletal:  Negative for arthralgias.   Neurological:  Negative for dizziness.     Objective:     BP (!) 140/90 (BP Location: Left arm, Patient Position: Sitting)   Pulse 81   Resp 18   Ht 5' 9" (1.753 m)   Wt 99.5 kg (219 lb 6.4 oz)   SpO2 95%   BMI 32.40 kg/m²     Physical Exam  Constitutional:       Appearance: Normal appearance. He is obese.   HENT:      Head: " Normocephalic and atraumatic.      Right Ear: External ear normal.      Left Ear: External ear normal.      Nose: Nose normal.      Mouth/Throat:      Mouth: Mucous membranes are moist.      Pharynx: Oropharynx is clear.   Eyes:      Pupils: Pupils are equal, round, and reactive to light.   Neck:      Vascular: No carotid bruit.   Cardiovascular:      Rate and Rhythm: Normal rate and regular rhythm.      Heart sounds: Normal heart sounds. No murmur heard.     No gallop.   Pulmonary:      Effort: Pulmonary effort is normal. No respiratory distress.      Breath sounds: Normal breath sounds. No wheezing.   Abdominal:      Palpations: Abdomen is soft.   Musculoskeletal:         General: Tenderness present. Normal range of motion.      Cervical back: Normal range of motion and neck supple.   Skin:     General: Skin is warm and dry.   Neurological:      General: No focal deficit present.      Mental Status: He is alert.   Psychiatric:         Mood and Affect: Mood normal.         Behavior: Behavior normal.         Thought Content: Thought content normal.         Judgment: Judgment normal.   Assessment:     1. Routine general medical examination at a health care facility    2. Flu vaccine need    3. Pain        Plan:     Problem List Items Addressed This Visit          ID    Flu vaccine need    Relevant Orders    Influenza - Quadrivalent *Preferred* (6 months+) (PF) (Completed)       Other    Pain    Relevant Orders    X-Ray Chest PA And Lateral (Completed)    X-Ray Thoracic Spine AP Lateral (Completed)    X-Ray Lumbar Spine AP And Lateral (Completed)    RESOLVED: Routine general medical examination at a health care facility - Primary     No follow-ups on file.  1m fu    I am having Mike Mims maintain his vitamin D, aspirin, ascorbic acid (vitamin C), sitaGLIPtin phosphate, dapaglifloz propaned-metformin, insulin syringe-needle U-100, pantoprazole, lisinopriL, celecoxib, HYDROcodone-acetaminophen, atorvastatin, and  LEVEMIR U-100 INSULIN.    Mike was seen today for hypertension and back pain.    Diagnoses and all orders for this visit:    Routine general medical examination at a health care facility    Flu vaccine need  -     Influenza - Quadrivalent *Preferred* (6 months+) (PF)    Pain  -     X-Ray Chest PA And Lateral; Future  -     X-Ray Thoracic Spine AP Lateral; Future  -     X-Ray Lumbar Spine AP And Lateral; Future         [unfilled]  Orders Placed This Encounter   Procedures    X-Ray Chest PA And Lateral     Standing Status:   Future     Number of Occurrences:   1     Standing Expiration Date:   11/15/2024     Order Specific Question:   May the Radiologist modify the order per protocol to meet the clinical needs of the patient?     Answer:   Yes     Order Specific Question:   Release to patient     Answer:   Immediate    X-Ray Thoracic Spine AP Lateral     Standing Status:   Future     Number of Occurrences:   1     Standing Expiration Date:   11/15/2024     Order Specific Question:   May the Radiologist modify the order per protocol to meet the clinical needs of the patient?     Answer:   Yes     Order Specific Question:   Release to patient     Answer:   Immediate    X-Ray Lumbar Spine AP And Lateral     Standing Status:   Future     Number of Occurrences:   1     Standing Expiration Date:   11/15/2024     Order Specific Question:   May the Radiologist modify the order per protocol to meet the clinical needs of the patient?     Answer:   Yes    Influenza - Quadrivalent *Preferred* (6 months+) (PF)

## 2023-11-16 ENCOUNTER — PATIENT OUTREACH (OUTPATIENT)
Dept: ADMINISTRATIVE | Facility: HOSPITAL | Age: 48
End: 2023-11-16

## 2023-11-16 ENCOUNTER — TELEPHONE (OUTPATIENT)
Dept: PRIMARY CARE CLINIC | Facility: CLINIC | Age: 48
End: 2023-11-16
Payer: COMMERCIAL

## 2023-11-16 RX ORDER — DAPAGLIFLOZIN AND METFORMIN HYDROCHLORIDE 5; 1000 MG/1; MG/1
1 TABLET, FILM COATED, EXTENDED RELEASE ORAL 2 TIMES DAILY
Qty: 180 TABLET | Refills: 3 | Status: SHIPPED | OUTPATIENT
Start: 2023-11-16 | End: 2023-11-16 | Stop reason: SDUPTHER

## 2023-11-16 NOTE — PROGRESS NOTES
Population Health Chart Review & Patient Outreach Details      Further Action Needed If Patient Returns Outreach:            Updates Requested / Reviewed:     []  Care Everywhere    []     []  External Sources ([facility], [facility], [facility], etc.)    [] [facility]   [] [facility]   [] Other:    [x]  Care Team Updated   []  Removed  or Duplicate Orders   []  Immunization Reconciliation Completed / Queried    [] [place]   [] [place]   [] [place]   [] [place]      Health Maintenance Topics Addressed and Outreach Outcomes / Actions Taken:             Breast Cancer Screening []  Mammogram Order Placed    []  Mammogram Screening Scheduled    []  External Records Requested & Care Team Updated if Applicable    []  External Records Uploaded & Care Team Updated if Applicable    []  Pt Declined Scheduling Mammogram    []  Pt Will Schedule with External Provider / Order Routed & Care Team Updated if Applicable              Cervical Cancer Screening []  Pap Smear Scheduled in Primary Care or OBGYN    []  External Records Requested & Care Team Updated if Applicable       []  External Records Uploaded, Care Team Updated, & History Updated if Applicable    []  Patient Declined Scheduling Pap Smear    []  Patient Will Schedule with External Provider & Care Team Updated if Applicable                  Colorectal Cancer Screening []  Colonoscopy Case Request / Referral / Home Test Order Placed    []  External Records Requested & Care Team Updated if Applicable    []  External Records Uploaded, Care Team Updated, & History Updated if Applicable    []  Patient Declined Completing Colon Cancer Screening    []  Patient Will Schedule with External Provider & Care Team Updated if Applicable    []  Fit Kit Mailed (add the SmartPhrase under additional notes)    []  Reminded Patient to Complete Home Test                Diabetic Eye Exam []  Eye Exam Screening Order Placed    []  Eye Camera Scheduled or Optometry/Ophthalmology Referral  Placed    []  External Records Requested & Care Team Updated if Applicable    []  External Records Uploaded, Care Team Updated, & History Updated if Applicable    []  Patient Declined Scheduling Eye Exam    []  Patient Will Schedule with External Provider & Care Team Updated if Applicable             Blood Pressure Control []  Primary Care Follow Up Visit Scheduled     []  Remote Blood Pressure Reading Captured    []  Patient Declined Remote Reading or Scheduling Appt - Escalated to PCP    []  Patient Will Call Back or Send Portal Message with Reading                 HbA1c & Other Labs []  Overdue Lab(s) Ordered    []  Overdue Lab(s) Scheduled    []  External Records Uploaded & Care Team Updated if Applicable    []  Primary Care Follow Up Visit Scheduled     []  Reminded Patient to Complete A1c Home Test    []  Patient Declined Scheduling Labs or Will Call Back to Schedule    []  Patient Will Schedule with External Provider / Order Routed, & Care Team Updated if Applicable           Primary Care Appointment []  Primary Care Appt Scheduled    []  Patient Declined Scheduling or Will Call Back to Schedule    []  Pt Established with External Provider, Updated Care Team, & Informed Pt to Notify Payor if Applicable           Medication Adherence /    Statin Use []  Primary Care Appointment Scheduled    []  Patient Reminded to  Prescription    []  Patient Declined, Provider Notified if Needed    []  Sent Provider Message to Review to Evaluate Pt for Statin, Add Exclusion Dx Codes, Document   Exclusion in Problem List, Change Statin Intensity Level to Moderate or High Intensity if Applicable                Osteoporosis Screening []  Dexa Order Placed    []  Dexa Appointment Scheduled    []  External Records Requested & Care Team Updated    []  External Records Uploaded, Care Team Updated, & History Updated if Applicable    []  Patient Declined Scheduling Dexa or Will Call Back to Schedule    []  Patient Will Schedule  with External Provider / Order Routed & Care Team Updated if Applicable       Additional Notes:.  Post visit Population Health review of encounter with date of service  11/15/23 with Shilpa. Not  alll required HY components in encounter.  Needs wellness plan message sent to provider's staff via staff message.  Followup appt for: 11/13/2024 HY

## 2023-11-17 RX ORDER — DAPAGLIFLOZIN AND METFORMIN HYDROCHLORIDE 5; 1000 MG/1; MG/1
1 TABLET, FILM COATED, EXTENDED RELEASE ORAL 2 TIMES DAILY
Qty: 180 TABLET | Refills: 3 | Status: SHIPPED | OUTPATIENT
Start: 2023-11-17

## 2023-12-14 ENCOUNTER — OFFICE VISIT (OUTPATIENT)
Dept: DIABETES SERVICES | Facility: CLINIC | Age: 48
End: 2023-12-14
Payer: COMMERCIAL

## 2023-12-14 VITALS
DIASTOLIC BLOOD PRESSURE: 78 MMHG | HEIGHT: 69 IN | OXYGEN SATURATION: 98 % | WEIGHT: 221.38 LBS | RESPIRATION RATE: 18 BRPM | SYSTOLIC BLOOD PRESSURE: 130 MMHG | HEART RATE: 87 BPM | BODY MASS INDEX: 32.79 KG/M2

## 2023-12-14 DIAGNOSIS — Z79.4 TYPE 2 DIABETES MELLITUS WITHOUT COMPLICATION, WITH LONG-TERM CURRENT USE OF INSULIN: Primary | ICD-10-CM

## 2023-12-14 DIAGNOSIS — E78.5 HYPERLIPIDEMIA, UNSPECIFIED HYPERLIPIDEMIA TYPE: ICD-10-CM

## 2023-12-14 DIAGNOSIS — I10 PRIMARY HYPERTENSION: ICD-10-CM

## 2023-12-14 DIAGNOSIS — E11.9 TYPE 2 DIABETES MELLITUS WITHOUT COMPLICATION, WITH LONG-TERM CURRENT USE OF INSULIN: Primary | ICD-10-CM

## 2023-12-14 LAB — GLUCOSE SERPL-MCNC: 125 MG/DL (ref 70–110)

## 2023-12-14 PROCEDURE — 3044F PR MOST RECENT HEMOGLOBIN A1C LEVEL <7.0%: ICD-10-PCS | Mod: ,,, | Performed by: NURSE PRACTITIONER

## 2023-12-14 PROCEDURE — 3078F PR MOST RECENT DIASTOLIC BLOOD PRESSURE < 80 MM HG: ICD-10-PCS | Mod: ,,, | Performed by: NURSE PRACTITIONER

## 2023-12-14 PROCEDURE — 3008F BODY MASS INDEX DOCD: CPT | Mod: ,,, | Performed by: NURSE PRACTITIONER

## 2023-12-14 PROCEDURE — 3075F PR MOST RECENT SYSTOLIC BLOOD PRESS GE 130-139MM HG: ICD-10-PCS | Mod: ,,, | Performed by: NURSE PRACTITIONER

## 2023-12-14 PROCEDURE — 1159F MED LIST DOCD IN RCRD: CPT | Mod: ,,, | Performed by: NURSE PRACTITIONER

## 2023-12-14 PROCEDURE — 82962 GLUCOSE BLOOD TEST: CPT | Mod: PBBFAC | Performed by: NURSE PRACTITIONER

## 2023-12-14 PROCEDURE — 4010F PR ACE/ARB THEARPY RXD/TAKEN: ICD-10-PCS | Mod: ,,, | Performed by: NURSE PRACTITIONER

## 2023-12-14 PROCEDURE — 99999PBSHW POCT GLUCOSE, HAND-HELD DEVICE: Mod: PBBFAC,,,

## 2023-12-14 PROCEDURE — 1159F PR MEDICATION LIST DOCUMENTED IN MEDICAL RECORD: ICD-10-PCS | Mod: ,,, | Performed by: NURSE PRACTITIONER

## 2023-12-14 PROCEDURE — 99214 OFFICE O/P EST MOD 30 MIN: CPT | Mod: S$PBB,,, | Performed by: NURSE PRACTITIONER

## 2023-12-14 PROCEDURE — 4010F ACE/ARB THERAPY RXD/TAKEN: CPT | Mod: ,,, | Performed by: NURSE PRACTITIONER

## 2023-12-14 PROCEDURE — 3075F SYST BP GE 130 - 139MM HG: CPT | Mod: ,,, | Performed by: NURSE PRACTITIONER

## 2023-12-14 PROCEDURE — 3008F PR BODY MASS INDEX (BMI) DOCUMENTED: ICD-10-PCS | Mod: ,,, | Performed by: NURSE PRACTITIONER

## 2023-12-14 PROCEDURE — 99214 PR OFFICE/OUTPT VISIT, EST, LEVL IV, 30-39 MIN: ICD-10-PCS | Mod: S$PBB,,, | Performed by: NURSE PRACTITIONER

## 2023-12-14 PROCEDURE — 99999PBSHW POCT GLUCOSE, HAND-HELD DEVICE: ICD-10-PCS | Mod: PBBFAC,,,

## 2023-12-14 PROCEDURE — 3044F HG A1C LEVEL LT 7.0%: CPT | Mod: ,,, | Performed by: NURSE PRACTITIONER

## 2023-12-14 PROCEDURE — 3078F DIAST BP <80 MM HG: CPT | Mod: ,,, | Performed by: NURSE PRACTITIONER

## 2023-12-14 PROCEDURE — 99214 OFFICE O/P EST MOD 30 MIN: CPT | Mod: PBBFAC | Performed by: NURSE PRACTITIONER

## 2023-12-14 NOTE — PROGRESS NOTES
Subjective:         Patient ID: Mike Mims is a 48 y.o. male.  Patient's current PCP is Ankit Lopes III, DO.     Chief Complaint: General Diabetes Follow-up (Here for routine follow up. Patient is checking glucose once daily. )    BRITNEY Mims is a 48 y.o. Black or  male presenting for an established visit for type 2 diabetes.   Received diabetes education:yes    At last visit :  No change in meds at this time.  Continue to monitor glucose bid and bring in meter to next visit.  Lifestyle modifications encouraged to include diet and exercise.     Screening or Prevention Patient's value Goal    HgA1C Testing and Control   Hemoglobin A1C   Date Value Ref Range Status   11/15/2023 6.8 (H) 4.5 - 6.6 % Final     Comment:       Normal:               <5.7%  Pre-Diabetic:       5.7% to 6.4%  Diabetic:             >6.4%  Diabetic Goal:     <7%   09/13/2023 7.7 (H) 4.5 - 6.6 % Final     Comment:       Normal:               <5.7%  Pre-Diabetic:       5.7% to 6.4%  Diabetic:             >6.4%  Diabetic Goal:     <7%   03/22/2023 6.5 4.5 - 6.6 % Final     Comment:       Normal:               <5.7%  Pre-Diabetic:       5.7% to 6.4%  Diabetic:             >6.4%  Diabetic Goal:     <7%       Annually/Less than 8%    Lipid profile Lab Results   Component Value Date    CHOL 132 11/15/2023    CHOL 96 03/22/2023    CHOL 117 11/15/2022     Lab Results   Component Value Date    HDL 46 11/15/2023    HDL 41 03/22/2023    HDL 45 11/15/2022     Lab Results   Component Value Date    LDLCALC 74 11/15/2023    LDLCALC 49 03/22/2023    LDLCALC 64 11/15/2022     Lab Results   Component Value Date    TRIG 59 11/15/2023    TRIG 31 (L) 03/22/2023    TRIG 40 11/15/2022       Lab Results   Component Value Date    CHOLHDL 2.9 11/15/2023    CHOLHDL 2.3 03/22/2023    CHOLHDL 2.6 11/15/2022      Annually    CMP CMP  Sodium   Date Value Ref Range Status   11/15/2023 141 136 - 145 mmol/L Final     Potassium   Date Value Ref  "Range Status   11/15/2023 3.7 3.5 - 5.1 mmol/L Final     Chloride   Date Value Ref Range Status   11/15/2023 109 (H) 98 - 107 mmol/L Final     CO2   Date Value Ref Range Status   11/15/2023 28 21 - 32 mmol/L Final     Glucose   Date Value Ref Range Status   11/15/2023 74 74 - 106 mg/dL Final     BUN   Date Value Ref Range Status   11/15/2023 16 7 - 18 mg/dL Final     Creatinine   Date Value Ref Range Status   11/15/2023 1.11 0.70 - 1.30 mg/dL Final     Calcium   Date Value Ref Range Status   11/15/2023 8.8 8.5 - 10.1 mg/dL Final     Total Protein   Date Value Ref Range Status   11/15/2023 7.3 6.4 - 8.2 g/dL Final     Albumin   Date Value Ref Range Status   11/15/2023 3.9 3.5 - 5.0 g/dL Final     Bilirubin, Total   Date Value Ref Range Status   11/15/2023 0.4 >0.0 - 1.2 mg/dL Final     Alk Phos   Date Value Ref Range Status   11/15/2023 91 45 - 115 U/L Final     AST   Date Value Ref Range Status   11/15/2023 14 (L) 15 - 37 U/L Final     ALT   Date Value Ref Range Status   11/15/2023 31 16 - 61 U/L Final     Anion Gap   Date Value Ref Range Status   11/15/2023 8 7 - 16 mmol/L Final     eGFR   Date Value Ref Range Status   11/15/2023 81 >=60 mL/min/1.73m2 Final     Annually    Microalbumin  Lab Results   Component Value Date    MICROALBUR 0.9 07/18/2022     Annually     LDL control Lab Results   Component Value Date    LDLCALC 74 11/15/2023    Annually/Less than 100 mg/dl     Nephropathy screening No results found for: "MICALBCREAT"  Lab Results   Component Value Date    PROTEINUA Negative 11/15/2023    Annually    Blood pressure BP Readings from Last 1 Encounters:   12/14/23 130/78    Less than 140/90    Dilated retinal exam  Annually    Foot exam   today Annually            Blood glucose testing is performed regularly. Patient is testing 2 times per day.  Meter:       Any episodes of hypoglycemia? Denies with the exception of when he had covid.     His blood sugar in the clinic today was:   Lab Results   Component " Value Date    POCGLU 125 (A) 12/14/2023       Review of Systems   Constitutional:  Negative for activity change, appetite change, diaphoresis and fatigue.   HENT:  Negative for nasal congestion, facial swelling and sinus pressure/congestion.    Eyes:  Negative for visual disturbance.   Respiratory:  Negative for shortness of breath and wheezing.    Cardiovascular:  Negative for chest pain and leg swelling.   Gastrointestinal:  Negative for constipation, diarrhea, nausea and vomiting.   Endocrine: Negative for polydipsia, polyphagia and polyuria.   Genitourinary:  Negative for dysuria, frequency and urgency.   Musculoskeletal:  Negative for gait problem and myalgias.   Integumentary:  Negative for color change, rash and wound.   Neurological:  Negative for dizziness, syncope, weakness, headaches and coordination difficulties.   Hematological:  Does not bruise/bleed easily.   Psychiatric/Behavioral:  Negative for self-injury, sleep disturbance and suicidal ideas. The patient is not nervous/anxious.         Objective:      Vitals:    12/14/23 0838   BP: 130/78   Pulse: 87   Resp: 18     Physical Exam  Vitals and nursing note reviewed.   Constitutional:       Appearance: Normal appearance.   HENT:      Head: Normocephalic.   Cardiovascular:      Rate and Rhythm: Normal rate and regular rhythm.      Pulses:           Dorsalis pedis pulses are 2+ on the right side and 2+ on the left side.        Posterior tibial pulses are 2+ on the right side and 2+ on the left side.      Heart sounds: Normal heart sounds.   Pulmonary:      Effort: Pulmonary effort is normal.      Breath sounds: Normal breath sounds.   Musculoskeletal:         General: Normal range of motion.      Right lower leg: No edema.      Left lower leg: No edema.      Right foot: Normal range of motion.      Left foot: Normal range of motion.   Feet:      Right foot:      Protective Sensation: 6 sites tested.  6 sites sensed.      Skin integrity: Skin integrity  normal.      Left foot:      Protective Sensation: 6 sites tested.  6 sites sensed.      Skin integrity: Skin integrity normal.   Skin:     General: Skin is warm and dry.   Neurological:      General: No focal deficit present.      Mental Status: He is alert and oriented to person, place, and time.   Psychiatric:         Mood and Affect: Mood normal.         Behavior: Behavior normal.         Thought Content: Thought content normal.         Judgment: Judgment normal.      Assessment/Plan   1. Type 2 diabetes mellitus without complication, with long-term current use of insulin  No change in meds today.  Lifestyle modifications encouraged.   -     POCT Glucose, Hand-Held Device    2. Primary hypertension  Lisinopril 5mg daily     3. Hyperlipidemia, unspecified hyperlipidemia type  Lipitor 80mg daily     Other orders  -     SITagliptin phosphate (JANUVIA) 100 MG Tab; Take 1 tablet (100 mg total) by mouth once daily.  Dispense: 90 tablet; Refill: 3          - Follow up:  6mo      I spent a total of 22 minutes on the day of the visit.This includes face to face time and non-face to face time preparing to see the patient (eg, review of tests), documenting clinical information in the electronic record, independently interpreting results and communicating results to the patient.    Loly Dinero FNP-BC ADM-BC  [facility]

## 2023-12-14 NOTE — PATIENT INSTRUCTIONS
No change in medications    Snacks with 0-5 grams carbs   Hard Boiled Egg   Crystal Light, Vitamin Water, Powerade Zero   Herbal tea, unsweetened   8 oz unsweetened almond milk   2 tsp peanut butter on celery   ½ cup sugar-free Jell-O   1 sugar-free popsicle   Non starchy vegetables such as carrots or celery sticks with lowfat dressing   ½ oz lowfat cheese or string cheese   1 closed handful of nuts or tbsp of seeds, unsalted    Snacks with 15 gram carbs  . 1 small piece of fruit or . banana or . cup light canned fruit  . 3 deion cracker squares  . 3 cups popcorn  . 5 Vanilla Wafers  . 1/2 cup low fat, no added sugar ice cream or frozen yogurt  . 1/2 turkey, ham, or chicken sandwich  . 1.2 cup fruit with 1/2 cup of cottage cheese  . 4-6 unsalted wheat crackers with 1 oz low fat cheese or 1 tbsp peanut butter  . 30 goldfish crackers  . 7-8 mini rice cakes  . 1/3 cup hummus dip with raw vegetables  . 1/2 whole wheat eladia, 1 tbsp hummus  . Mini pizza (. whole wheat English muffin, low-fat cheese, tomato sauce)  . 100 calorie snack pack  . 4-6 oz light yogurt  . 1/2 cup sugar-free pudding

## 2024-02-12 RX ORDER — SYRINGE AND NEEDLE,INSULIN,1ML 31GX15/64"
SYRINGE, EMPTY DISPOSABLE MISCELLANEOUS
Qty: 100 EACH | Refills: 3 | Status: SHIPPED | OUTPATIENT
Start: 2024-02-12

## 2024-02-27 DIAGNOSIS — M54.9 BACK PAIN, UNSPECIFIED BACK LOCATION, UNSPECIFIED BACK PAIN LATERALITY, UNSPECIFIED CHRONICITY: Primary | ICD-10-CM

## 2024-02-27 RX ORDER — FLUCONAZOLE 200 MG/1
200 TABLET ORAL DAILY
Qty: 5 TABLET | Refills: 2 | Status: SHIPPED | OUTPATIENT
Start: 2024-02-27 | End: 2024-03-25 | Stop reason: SDUPTHER

## 2024-02-27 RX ORDER — HYDROCODONE BITARTRATE AND ACETAMINOPHEN 7.5; 325 MG/1; MG/1
1 TABLET ORAL EVERY 6 HOURS PRN
Qty: 50 TABLET | Refills: 0 | Status: SHIPPED | OUTPATIENT
Start: 2024-02-27 | End: 2024-03-25 | Stop reason: SDUPTHER

## 2024-03-25 ENCOUNTER — OFFICE VISIT (OUTPATIENT)
Dept: PRIMARY CARE CLINIC | Facility: CLINIC | Age: 49
End: 2024-03-25
Payer: COMMERCIAL

## 2024-03-25 VITALS
OXYGEN SATURATION: 98 % | SYSTOLIC BLOOD PRESSURE: 122 MMHG | WEIGHT: 221 LBS | HEIGHT: 69 IN | DIASTOLIC BLOOD PRESSURE: 84 MMHG | BODY MASS INDEX: 32.73 KG/M2 | RESPIRATION RATE: 18 BRPM | HEART RATE: 89 BPM

## 2024-03-25 DIAGNOSIS — K56.609 SBO (SMALL BOWEL OBSTRUCTION): ICD-10-CM

## 2024-03-25 DIAGNOSIS — E11.01 TYPE 2 DIABETES MELLITUS WITH HYPEROSMOLAR COMA, WITH LONG-TERM CURRENT USE OF INSULIN: ICD-10-CM

## 2024-03-25 DIAGNOSIS — R52 PAIN: ICD-10-CM

## 2024-03-25 DIAGNOSIS — E11.9 TYPE 2 DIABETES MELLITUS WITHOUT COMPLICATION, UNSPECIFIED WHETHER LONG TERM INSULIN USE: Primary | ICD-10-CM

## 2024-03-25 DIAGNOSIS — I10 PRIMARY HYPERTENSION: ICD-10-CM

## 2024-03-25 DIAGNOSIS — Z79.4 TYPE 2 DIABETES MELLITUS WITH HYPEROSMOLAR COMA, WITH LONG-TERM CURRENT USE OF INSULIN: ICD-10-CM

## 2024-03-25 DIAGNOSIS — E78.5 HYPERLIPIDEMIA, UNSPECIFIED HYPERLIPIDEMIA TYPE: ICD-10-CM

## 2024-03-25 PROCEDURE — 3074F SYST BP LT 130 MM HG: CPT | Mod: ,,, | Performed by: FAMILY MEDICINE

## 2024-03-25 PROCEDURE — 99214 OFFICE O/P EST MOD 30 MIN: CPT | Mod: ,,, | Performed by: FAMILY MEDICINE

## 2024-03-25 PROCEDURE — 3008F BODY MASS INDEX DOCD: CPT | Mod: ,,, | Performed by: FAMILY MEDICINE

## 2024-03-25 PROCEDURE — 4010F ACE/ARB THERAPY RXD/TAKEN: CPT | Mod: ,,, | Performed by: FAMILY MEDICINE

## 2024-03-25 PROCEDURE — 3079F DIAST BP 80-89 MM HG: CPT | Mod: ,,, | Performed by: FAMILY MEDICINE

## 2024-03-25 PROCEDURE — 1159F MED LIST DOCD IN RCRD: CPT | Mod: ,,, | Performed by: FAMILY MEDICINE

## 2024-03-25 RX ORDER — FLUCONAZOLE 200 MG/1
200 TABLET ORAL DAILY
Qty: 5 TABLET | Refills: 2 | Status: SHIPPED | OUTPATIENT
Start: 2024-03-25 | End: 2024-04-24

## 2024-03-25 RX ORDER — HYDROCODONE BITARTRATE AND ACETAMINOPHEN 7.5; 325 MG/1; MG/1
1 TABLET ORAL EVERY 6 HOURS PRN
Qty: 50 TABLET | Refills: 0 | Status: SHIPPED | OUTPATIENT
Start: 2024-03-25

## 2024-03-25 NOTE — PROGRESS NOTES
"Subjective:      Patient ID: Mike Mims is a 48 y.o. male.    Chief Complaint: Follow-up (6mon. Ck-up) and Hypertension    Mike Mims a 48 y.o. male presents for follow up on all regular problems which are reviewed and discussed.     Problem List Items Addressed This Visit          Cardiac/Vascular    HTN (hypertension)    Hyperlipidemia       Endocrine    Type 2 diabetes mellitus, with long-term current use of insulin    Type 2 diabetes mellitus without complication - Primary    Relevant Orders    Hemoglobin A1C       GI    SBO (small bowel obstruction)       Other    Pain       Past Medical History:  Past Medical History:   Diagnosis Date    Diabetes mellitus     Diabetes mellitus, type 2     HTN (hypertension)     Hyperlipidemia      Past Surgical History:   Procedure Laterality Date    CHOLECYSTECTOMY      HERNIA REPAIR      ROTATOR CUFF REPAIR       Review of patient's allergies indicates:  No Known Allergies  Current Outpatient Medications on File Prior to Visit   Medication Sig Dispense Refill    ascorbic acid, vitamin C, (VITAMIN C) 500 MG tablet Take 500 mg by mouth once daily.      aspirin (ECOTRIN) 81 MG EC tablet Take 81 mg by mouth once daily.      atorvastatin (LIPITOR) 80 MG tablet TAKE 1 TABLET BY MOUTH EVERY DAY 30 tablet 11    celecoxib (CELEBREX) 200 MG capsule Take 1 capsule (200 mg total) by mouth 2 (two) times daily. 60 capsule 5    dapaglifloz propaned-metformin (XIGDUO XR) 5-1,000 mg Take 1 tablet by mouth 2 (two) times daily. 180 tablet 3    insulin detemir U-100 (LEVEMIR U-100 INSULIN) 100 unit/mL injection INJECT 50 UNITS INTO THE SKIN ONCE DAILY. 20 mL 3    insulin syringe-needle U-100 (BD VEO INSULIN SYRINGE UF) 1 mL 31 gauge x 15/64" Syrg USE 3 TIMES A DAY TO INJECT INSULIN Strength: 1 mL 31 gauge x 15/64" 100 each 3    lisinopriL (PRINIVIL,ZESTRIL) 5 MG tablet Take 1 tablet (5 mg total) by mouth once daily. 90 tablet 3    pantoprazole (PROTONIX) 40 MG tablet Take 1 tablet (40 mg " "total) by mouth once daily. 90 tablet 3    SITagliptin phosphate (JANUVIA) 100 MG Tab Take 1 tablet (100 mg total) by mouth once daily. 90 tablet 3    vitamin D (VITAMIN D3) 1000 units Tab Take 1,000 Units by mouth 2 (two) times a day.      [DISCONTINUED] fluconazole (DIFLUCAN) 200 MG Tab Take 1 tablet (200 mg total) by mouth once daily. 5 tablet 2    [DISCONTINUED] HYDROcodone-acetaminophen (NORCO) 7.5-325 mg per tablet Take 1 tablet by mouth every 6 (six) hours as needed for Pain. 50 tablet 0     No current facility-administered medications on file prior to visit.     Social History     Socioeconomic History    Marital status:      Spouse name: andrei    Number of children: 3    Years of education: 16    Highest education level: Bachelor's degree (e.g., BA, AB, BS)   Tobacco Use    Smoking status: Never    Smokeless tobacco: Never   Substance and Sexual Activity    Alcohol use: Yes     Comment: occasional liquor    Drug use: Never    Sexual activity: Yes     Family History   Problem Relation Age of Onset    Diabetes Mother     Diabetes Father     Prostate cancer Father     Diabetes Sister     Diabetes Brother     Diabetes Sister     Depression Sister     No Known Problems Sister        Review of Systems   Constitutional: Negative.    HENT:  Negative for congestion, ear pain, nosebleeds and trouble swallowing.    Eyes:  Negative for pain and itching.   Respiratory:  Negative for chest tightness.    Cardiovascular:  Negative for chest pain.   Gastrointestinal:  Negative for abdominal distention.   Endocrine: Negative for cold intolerance and heat intolerance.   Genitourinary:  Negative for difficulty urinating.   Musculoskeletal:  Positive for back pain. Negative for arthralgias.   Neurological:  Negative for dizziness.       Objective:     /84 (BP Location: Right arm, Patient Position: Sitting, BP Method: Large (Manual))   Pulse 89   Resp 18   Ht 5' 9" (1.753 m)   Wt 100.2 kg (221 lb)   SpO2 98%  "  BMI 32.64 kg/m²     Physical Exam  Constitutional:       Appearance: Normal appearance. He is obese.   HENT:      Head: Normocephalic and atraumatic.      Right Ear: External ear normal.      Left Ear: External ear normal.      Nose: Congestion present.      Mouth/Throat:      Mouth: Mucous membranes are moist.      Pharynx: Oropharynx is clear.   Eyes:      Pupils: Pupils are equal, round, and reactive to light.   Cardiovascular:      Rate and Rhythm: Normal rate and regular rhythm.      Heart sounds: Normal heart sounds. No murmur heard.     No gallop.   Pulmonary:      Effort: Pulmonary effort is normal. No respiratory distress.      Breath sounds: Normal breath sounds. No wheezing or rales.   Abdominal:      Palpations: Abdomen is soft.   Musculoskeletal:         General: Normal range of motion.      Cervical back: Normal range of motion and neck supple.   Skin:     General: Skin is warm and dry.   Neurological:      General: No focal deficit present.      Mental Status: He is alert.   Psychiatric:         Mood and Affect: Mood normal.         Behavior: Behavior normal.         Thought Content: Thought content normal.         Judgment: Judgment normal.         1. Type 2 diabetes mellitus without complication, unspecified whether long term insulin use    2. Primary hypertension    3. Hyperlipidemia, unspecified hyperlipidemia type    4. Type 2 diabetes mellitus with hyperosmolar coma, with long-term current use of insulin    5. SBO (small bowel obstruction)    6. Pain        Plan:     Problem List Items Addressed This Visit          Cardiac/Vascular    HTN (hypertension)    Hyperlipidemia       Endocrine    Type 2 diabetes mellitus, with long-term current use of insulin    Type 2 diabetes mellitus without complication - Primary    Relevant Orders    Hemoglobin A1C       GI    SBO (small bowel obstruction)       Other    Pain     No follow-ups on file.  Rxs  Lab owo  6m fu    I am having Mike Mims maintain  his vitamin D, aspirin, ascorbic acid (vitamin C), pantoprazole, lisinopriL, atorvastatin, LEVEMIR U-100 INSULIN, celecoxib, XIGDUO XR, SITagliptin phosphate, insulin syringe-needle U-100, HYDROcodone-acetaminophen, and fluconazole.    Mike was seen today for follow-up and hypertension.    Diagnoses and all orders for this visit:    Type 2 diabetes mellitus without complication, unspecified whether long term insulin use  -     Hemoglobin A1C; Future    Primary hypertension    Hyperlipidemia, unspecified hyperlipidemia type    Type 2 diabetes mellitus with hyperosmolar coma, with long-term current use of insulin    SBO (small bowel obstruction)    Pain    Other orders  -     HYDROcodone-acetaminophen (NORCO) 7.5-325 mg per tablet; Take 1 tablet by mouth every 6 (six) hours as needed for Pain.  -     fluconazole (DIFLUCAN) 200 MG Tab; Take 1 tablet (200 mg total) by mouth once daily.      Medications Ordered This Encounter   Medications    fluconazole (DIFLUCAN) 200 MG Tab     Sig: Take 1 tablet (200 mg total) by mouth once daily.     Dispense:  5 tablet     Refill:  2    HYDROcodone-acetaminophen (NORCO) 7.5-325 mg per tablet     Sig: Take 1 tablet by mouth every 6 (six) hours as needed for Pain.     Dispense:  50 tablet     Refill:  0     n/a      Order Specific Question:   I have reviewed the Prescription Drug Monitoring Program (PDMP) database for this patient prior to prescribing the above opioid medication     Answer:   Yes     [unfilled]  Orders Placed This Encounter   Procedures    Hemoglobin A1C     Standing Status:   Future     Standing Expiration Date:   6/23/2025

## 2024-04-01 RX ORDER — PANTOPRAZOLE SODIUM 40 MG/1
40 TABLET, DELAYED RELEASE ORAL
Qty: 30 TABLET | Refills: 11 | Status: SHIPPED | OUTPATIENT
Start: 2024-04-01

## 2024-04-01 RX ORDER — LISINOPRIL 5 MG/1
5 TABLET ORAL
Qty: 30 TABLET | Refills: 11 | Status: SHIPPED | OUTPATIENT
Start: 2024-04-01

## 2024-04-08 ENCOUNTER — PATIENT MESSAGE (OUTPATIENT)
Dept: INTERNAL MEDICINE | Facility: CLINIC | Age: 49
End: 2024-04-08
Payer: COMMERCIAL

## 2024-04-08 ENCOUNTER — TELEPHONE (OUTPATIENT)
Dept: DIABETES SERVICES | Facility: CLINIC | Age: 49
End: 2024-04-08
Payer: COMMERCIAL

## 2024-04-08 NOTE — TELEPHONE ENCOUNTER
Sent patient portal  message to notify them of cancellation of their Diabetes Management appointment with Loly Dinero due to provider no longer practicing at [facility] effective April 19th 2024. Patient was instructed to follow up with their preferred primary care provider for continued care.

## 2024-07-24 ENCOUNTER — OFFICE VISIT (OUTPATIENT)
Dept: PRIMARY CARE CLINIC | Facility: CLINIC | Age: 49
End: 2024-07-24
Payer: COMMERCIAL

## 2024-07-24 VITALS
HEART RATE: 110 BPM | DIASTOLIC BLOOD PRESSURE: 66 MMHG | HEIGHT: 69 IN | SYSTOLIC BLOOD PRESSURE: 114 MMHG | WEIGHT: 222 LBS | BODY MASS INDEX: 32.88 KG/M2 | OXYGEN SATURATION: 98 % | RESPIRATION RATE: 18 BRPM

## 2024-07-24 DIAGNOSIS — E11.9 TYPE 2 DIABETES MELLITUS WITHOUT COMPLICATION, WITHOUT LONG-TERM CURRENT USE OF INSULIN: ICD-10-CM

## 2024-07-24 DIAGNOSIS — E78.5 HYPERLIPIDEMIA, UNSPECIFIED HYPERLIPIDEMIA TYPE: ICD-10-CM

## 2024-07-24 DIAGNOSIS — M25.562 LEFT KNEE PAIN, UNSPECIFIED CHRONICITY: ICD-10-CM

## 2024-07-24 DIAGNOSIS — I10 PRIMARY HYPERTENSION: ICD-10-CM

## 2024-07-24 DIAGNOSIS — U07.1 COVID: Primary | ICD-10-CM

## 2024-07-24 LAB
CTP QC/QA: YES
CTP QC/QA: YES
POC MOLECULAR INFLUENZA A AGN: NEGATIVE
POC MOLECULAR INFLUENZA B AGN: NEGATIVE
SARS-COV-2 RDRP RESP QL NAA+PROBE: POSITIVE

## 2024-07-24 PROCEDURE — 87502 INFLUENZA DNA AMP PROBE: CPT | Mod: QW,,, | Performed by: FAMILY MEDICINE

## 2024-07-24 PROCEDURE — 3074F SYST BP LT 130 MM HG: CPT | Mod: ,,, | Performed by: FAMILY MEDICINE

## 2024-07-24 PROCEDURE — 3052F HG A1C>EQUAL 8.0%<EQUAL 9.0%: CPT | Mod: ,,, | Performed by: FAMILY MEDICINE

## 2024-07-24 PROCEDURE — 99214 OFFICE O/P EST MOD 30 MIN: CPT | Mod: 25,,, | Performed by: FAMILY MEDICINE

## 2024-07-24 PROCEDURE — 1159F MED LIST DOCD IN RCRD: CPT | Mod: ,,, | Performed by: FAMILY MEDICINE

## 2024-07-24 PROCEDURE — 4010F ACE/ARB THERAPY RXD/TAKEN: CPT | Mod: ,,, | Performed by: FAMILY MEDICINE

## 2024-07-24 PROCEDURE — 3078F DIAST BP <80 MM HG: CPT | Mod: ,,, | Performed by: FAMILY MEDICINE

## 2024-07-24 PROCEDURE — 3008F BODY MASS INDEX DOCD: CPT | Mod: ,,, | Performed by: FAMILY MEDICINE

## 2024-07-24 PROCEDURE — 96372 THER/PROPH/DIAG INJ SC/IM: CPT | Mod: ,,, | Performed by: FAMILY MEDICINE

## 2024-07-24 PROCEDURE — 87635 SARS-COV-2 COVID-19 AMP PRB: CPT | Mod: QW,,, | Performed by: FAMILY MEDICINE

## 2024-07-24 RX ORDER — LINCOMYCIN HYDROCHLORIDE 300 MG/ML
600 INJECTION, SOLUTION INTRAMUSCULAR; INTRAVENOUS; SUBCONJUNCTIVAL
Status: COMPLETED | OUTPATIENT
Start: 2024-07-24 | End: 2024-07-24

## 2024-07-24 RX ORDER — DEXAMETHASONE SODIUM PHOSPHATE 4 MG/ML
4 INJECTION, SOLUTION INTRA-ARTICULAR; INTRALESIONAL; INTRAMUSCULAR; INTRAVENOUS; SOFT TISSUE
Status: COMPLETED | OUTPATIENT
Start: 2024-07-24 | End: 2024-07-24

## 2024-07-24 RX ORDER — NIRMATRELVIR AND RITONAVIR 300-100 MG
KIT ORAL
Qty: 30 TABLET | Refills: 0 | Status: SHIPPED | OUTPATIENT
Start: 2024-07-24

## 2024-07-24 RX ORDER — AZITHROMYCIN 250 MG/1
TABLET, FILM COATED ORAL
Qty: 6 TABLET | Refills: 1 | Status: SHIPPED | OUTPATIENT
Start: 2024-07-24 | End: 2024-07-29

## 2024-07-24 RX ORDER — BENZONATATE 200 MG/1
200 CAPSULE ORAL 3 TIMES DAILY PRN
Qty: 30 CAPSULE | Refills: 3 | Status: SHIPPED | OUTPATIENT
Start: 2024-07-24 | End: 2024-08-03

## 2024-07-24 RX ORDER — METHYLPREDNISOLONE ACETATE 80 MG/ML
80 INJECTION, SUSPENSION INTRA-ARTICULAR; INTRALESIONAL; INTRAMUSCULAR; SOFT TISSUE
Status: COMPLETED | OUTPATIENT
Start: 2024-07-24 | End: 2024-07-24

## 2024-07-24 RX ADMIN — LINCOMYCIN HYDROCHLORIDE 600 MG: 300 INJECTION, SOLUTION INTRAMUSCULAR; INTRAVENOUS; SUBCONJUNCTIVAL at 03:07

## 2024-07-24 RX ADMIN — DEXAMETHASONE SODIUM PHOSPHATE 4 MG: 4 INJECTION, SOLUTION INTRA-ARTICULAR; INTRALESIONAL; INTRAMUSCULAR; INTRAVENOUS; SOFT TISSUE at 03:07

## 2024-07-24 RX ADMIN — METHYLPREDNISOLONE ACETATE 80 MG: 80 INJECTION, SUSPENSION INTRA-ARTICULAR; INTRALESIONAL; INTRAMUSCULAR; SOFT TISSUE at 03:07

## 2024-07-24 NOTE — PROGRESS NOTES
"Subjective:      Patient ID: Mike Mims is a 49 y.o. male.    Chief Complaint: Chest Congestion and Cough    Mike Mims a 49 y.o. male presents for follow up on all regular problems which are reviewed and discussed.   Tests here + covid  Problem List Items Addressed This Visit          Cardiac/Vascular    HTN (hypertension)    Hyperlipidemia       Endocrine    Type 2 diabetes mellitus without complication       Orthopedic    Left knee pain     Other Visit Diagnoses       COVID    -  Primary            Past Medical History:  Past Medical History:   Diagnosis Date    Diabetes mellitus     Diabetes mellitus, type 2     HTN (hypertension)     Hyperlipidemia      Past Surgical History:   Procedure Laterality Date    CHOLECYSTECTOMY      HERNIA REPAIR      ROTATOR CUFF REPAIR       Review of patient's allergies indicates:  No Known Allergies  Current Outpatient Medications on File Prior to Visit   Medication Sig Dispense Refill    ascorbic acid, vitamin C, (VITAMIN C) 500 MG tablet Take 500 mg by mouth once daily.      aspirin (ECOTRIN) 81 MG EC tablet Take 81 mg by mouth once daily.      atorvastatin (LIPITOR) 80 MG tablet TAKE 1 TABLET BY MOUTH EVERY DAY 30 tablet 11    celecoxib (CELEBREX) 200 MG capsule Take 1 capsule (200 mg total) by mouth 2 (two) times daily. 60 capsule 5    dapaglifloz propaned-metformin (XIGDUO XR) 5-1,000 mg Take 1 tablet by mouth 2 (two) times daily. 180 tablet 3    HYDROcodone-acetaminophen (NORCO) 7.5-325 mg per tablet Take 1 tablet by mouth every 6 (six) hours as needed for Pain. 50 tablet 0    insulin detemir U-100 (LEVEMIR U-100 INSULIN) 100 unit/mL injection INJECT 50 UNITS INTO THE SKIN ONCE DAILY. 20 mL 3    insulin syringe-needle U-100 (BD VEO INSULIN SYRINGE UF) 1 mL 31 gauge x 15/64" Syrg USE 3 TIMES A DAY TO INJECT INSULIN Strength: 1 mL 31 gauge x 15/64" 100 each 3    lisinopriL (PRINIVIL,ZESTRIL) 5 MG tablet TAKE 1 TABLET BY MOUTH EVERY DAY 30 tablet 11    pantoprazole " "(PROTONIX) 40 MG tablet TAKE 1 TABLET BY MOUTH EVERY DAY 30 tablet 11    SITagliptin phosphate (JANUVIA) 100 MG Tab Take 1 tablet (100 mg total) by mouth once daily. 90 tablet 3    vitamin D (VITAMIN D3) 1000 units Tab Take 1,000 Units by mouth 2 (two) times a day.       No current facility-administered medications on file prior to visit.     Social History     Socioeconomic History    Marital status:      Spouse name: andrei    Number of children: 3    Years of education: 16    Highest education level: Bachelor's degree (e.g., BA, AB, BS)   Tobacco Use    Smoking status: Never    Smokeless tobacco: Never   Substance and Sexual Activity    Alcohol use: Yes     Comment: occasional liquor    Drug use: Never    Sexual activity: Yes     Family History   Problem Relation Name Age of Onset    Diabetes Mother      Diabetes Father      Prostate cancer Father      Diabetes Sister      Diabetes Brother      Diabetes Sister      Depression Sister      No Known Problems Sister         Review of Systems   Constitutional: Negative.    HENT:  Negative for congestion, ear pain, nosebleeds and trouble swallowing.    Eyes:  Negative for pain and itching.   Respiratory:  Negative for chest tightness.    Cardiovascular:  Negative for chest pain.   Gastrointestinal:  Negative for abdominal distention.   Endocrine: Negative for cold intolerance and heat intolerance.   Genitourinary:  Negative for difficulty urinating.   Musculoskeletal:  Negative for arthralgias.   Neurological:  Negative for dizziness.       Objective:     /66 (BP Location: Right arm, Patient Position: Sitting, BP Method: Large (Manual))   Pulse 110   Resp 18   Ht 5' 9" (1.753 m)   Wt 100.7 kg (222 lb)   SpO2 98%   BMI 32.78 kg/m²     Physical Exam  Constitutional:       Appearance: Normal appearance. He is obese.   HENT:      Head: Normocephalic and atraumatic.      Right Ear: External ear normal.      Left Ear: External ear normal.      Nose: Nose " normal.      Mouth/Throat:      Mouth: Mucous membranes are moist.      Pharynx: Oropharynx is clear.   Eyes:      Pupils: Pupils are equal, round, and reactive to light.   Cardiovascular:      Rate and Rhythm: Normal rate and regular rhythm.      Heart sounds: Normal heart sounds. No murmur heard.     No gallop.   Pulmonary:      Effort: Pulmonary effort is normal. No respiratory distress.      Breath sounds: Normal breath sounds. No wheezing or rales.   Abdominal:      Palpations: Abdomen is soft.   Musculoskeletal:         General: Normal range of motion.      Cervical back: Normal range of motion and neck supple.   Skin:     General: Skin is warm and dry.   Neurological:      General: No focal deficit present.      Mental Status: He is alert.   Psychiatric:         Mood and Affect: Mood normal.         Behavior: Behavior normal.         Thought Content: Thought content normal.         Judgment: Judgment normal.         1. COVID    2. Primary hypertension    3. Hyperlipidemia, unspecified hyperlipidemia type    4. Type 2 diabetes mellitus without complication, without long-term current use of insulin    5. Left knee pain, unspecified chronicity        Plan:     Problem List Items Addressed This Visit          Cardiac/Vascular    HTN (hypertension)    Hyperlipidemia       Endocrine    Type 2 diabetes mellitus without complication       Orthopedic    Left knee pain     Other Visit Diagnoses       COVID    -  Primary          No follow-ups on file.  Shots for dx [id number]  Rxs sent    I am having Mike Mims start on PAXLOVID, azithromycin, and benzonatate. I am also having him maintain his vitamin D, aspirin, ascorbic acid (vitamin C), atorvastatin, LEVEMIR U-100 INSULIN, celecoxib, XIGDUO XR, SITagliptin phosphate, insulin syringe-needle U-100, HYDROcodone-acetaminophen, pantoprazole, and lisinopriL. We will continue to administer dexAMETHasone, methylPREDNISolone acetate, and lincomycin.    Mike was seen today  for chest congestion and cough.    Diagnoses and all orders for this visit:    COVID    Primary hypertension    Hyperlipidemia, unspecified hyperlipidemia type    Type 2 diabetes mellitus without complication, without long-term current use of insulin    Left knee pain, unspecified chronicity    Other orders  -     dexAMETHasone injection 4 mg  -     methylPREDNISolone acetate injection 80 mg  -     lincomycin injection 600 mg  -     nirmatrelvir-ritonavir (PAXLOVID) 300 mg (150 mg x 2)-100 mg copackaged tablets (EUA); Take 3 tablets by mouth 2 (two) times daily. Each dose contains 2 nirmatrelvir (pink tablets) and 1 ritonavir (white tablet). Take all 3 tablets together  -     azithromycin (Z-EUGENIO) 250 MG tablet; Take 2 tablets by mouth on day 1; Take 1 tablet by mouth on days 2-5  -     benzonatate (TESSALON) 200 MG capsule; Take 1 capsule (200 mg total) by mouth 3 (three) times daily as needed for Cough.      Medications Ordered This Encounter   Medications    azithromycin (Z-EUGENIO) 250 MG tablet     Sig: Take 2 tablets by mouth on day 1; Take 1 tablet by mouth on days 2-5     Dispense:  6 tablet     Refill:  1    benzonatate (TESSALON) 200 MG capsule     Sig: Take 1 capsule (200 mg total) by mouth 3 (three) times daily as needed for Cough.     Dispense:  30 capsule     Refill:  3    dexAMETHasone injection 4 mg    lincomycin injection 600 mg    methylPREDNISolone acetate injection 80 mg    nirmatrelvir-ritonavir (PAXLOVID) 300 mg (150 mg x 2)-100 mg copackaged tablets (EUA)     Sig: Take 3 tablets by mouth 2 (two) times daily. Each dose contains 2 nirmatrelvir (pink tablets) and 1 ritonavir (white tablet). Take all 3 tablets together     Dispense:  30 tablet     Refill:  0     [unfilled]  No orders of the defined types were placed in this encounter.

## 2024-08-01 ENCOUNTER — PATIENT MESSAGE (OUTPATIENT)
Dept: DIABETES SERVICES | Facility: CLINIC | Age: 49
End: 2024-08-01
Payer: COMMERCIAL

## 2024-09-03 RX ORDER — CELECOXIB 200 MG/1
200 CAPSULE ORAL 2 TIMES DAILY
Qty: 60 CAPSULE | Refills: 5 | Status: SHIPPED | OUTPATIENT
Start: 2024-09-03

## 2024-10-02 RX ORDER — ATORVASTATIN CALCIUM 80 MG/1
TABLET, FILM COATED ORAL
Qty: 30 TABLET | Refills: 11 | Status: SHIPPED | OUTPATIENT
Start: 2024-10-02

## 2024-10-08 ENCOUNTER — PATIENT OUTREACH (OUTPATIENT)
Facility: HOSPITAL | Age: 49
End: 2024-10-08
Payer: COMMERCIAL

## 2024-10-17 ENCOUNTER — HOSPITAL ENCOUNTER (EMERGENCY)
Facility: HOSPITAL | Age: 49
Discharge: HOME | End: 2024-10-17
Payer: COMMERCIAL

## 2024-10-17 VITALS
HEART RATE: 100 BPM | HEIGHT: 69 IN | TEMPERATURE: 98 F | BODY MASS INDEX: 30.36 KG/M2 | DIASTOLIC BLOOD PRESSURE: 78 MMHG | OXYGEN SATURATION: 95 % | WEIGHT: 205 LBS | SYSTOLIC BLOOD PRESSURE: 118 MMHG | RESPIRATION RATE: 20 BRPM

## 2024-10-17 DIAGNOSIS — S39.012A LUMBAR STRAIN, INITIAL ENCOUNTER: Primary | ICD-10-CM

## 2024-10-17 PROCEDURE — 99284 EMERGENCY DEPT VISIT MOD MDM: CPT | Mod: 25

## 2024-10-17 PROCEDURE — 63600175 PHARM REV CODE 636 W HCPCS: Performed by: NURSE PRACTITIONER

## 2024-10-17 PROCEDURE — 96372 THER/PROPH/DIAG INJ SC/IM: CPT | Performed by: NURSE PRACTITIONER

## 2024-10-17 RX ORDER — KETOROLAC TROMETHAMINE 30 MG/ML
60 INJECTION, SOLUTION INTRAMUSCULAR; INTRAVENOUS
Status: COMPLETED | OUTPATIENT
Start: 2024-10-17 | End: 2024-10-17

## 2024-10-17 RX ORDER — METHOCARBAMOL 500 MG/1
1000 TABLET, FILM COATED ORAL 3 TIMES DAILY
Qty: 30 TABLET | Refills: 0 | Status: SHIPPED | OUTPATIENT
Start: 2024-10-17 | End: 2024-10-22

## 2024-10-17 RX ORDER — IBUPROFEN 800 MG/1
800 TABLET ORAL 3 TIMES DAILY
Qty: 15 TABLET | Refills: 0 | Status: SHIPPED | OUTPATIENT
Start: 2024-10-17 | End: 2024-10-22

## 2024-10-17 RX ADMIN — KETOROLAC TROMETHAMINE 60 MG: 30 INJECTION, SOLUTION INTRAMUSCULAR at 11:10

## 2024-10-17 NOTE — ED PROVIDER NOTES
Encounter Date: 10/17/2024       History     Chief Complaint   Patient presents with    Back Pain     X 2 weeks     49-year-old male presents to the emergency department to be evaluated for low back pain.  He reports he began a few days ago.  Denies any recent fall or injury.  He was evaluated in clinic a few days ago and prescribed some Norco that has not helped with his pain.  He is being referred to pain management and has an appointment in about a week from now.  Denies any numbness or weakness in his lower extremities, loss of control of bowels or bladder, dysuria, fever, chills.    The history is provided by the patient.   Back Pain   This is a new problem. The pain is associated with no known injury. Pertinent negatives include no chest pain, no fever, no numbness, no weight loss, no headaches, no abdominal pain, no abdominal swelling, no bowel incontinence, no perianal numbness, no bladder incontinence, no dysuria, no pelvic pain, no leg pain, no paresthesias, no paresis, no tingling and no weakness.     Review of patient's allergies indicates:  No Known Allergies  Past Medical History:   Diagnosis Date    Diabetes mellitus     Diabetes mellitus, type 2     HTN (hypertension)     Hyperlipidemia      Past Surgical History:   Procedure Laterality Date    CHOLECYSTECTOMY      HERNIA REPAIR      ROTATOR CUFF REPAIR       Family History   Problem Relation Name Age of Onset    Diabetes Mother      Diabetes Father      Prostate cancer Father      Diabetes Sister      Diabetes Brother      Diabetes Sister      Depression Sister      No Known Problems Sister       Social History     Tobacco Use    Smoking status: Never    Smokeless tobacco: Never   Substance Use Topics    Alcohol use: Yes     Comment: occasional liquor    Drug use: Never     Review of Systems   Constitutional:  Negative for fever and weight loss.   Cardiovascular:  Negative for chest pain.   Gastrointestinal:  Negative for abdominal pain and bowel  incontinence.   Genitourinary:  Negative for bladder incontinence, dysuria and pelvic pain.   Musculoskeletal:  Positive for back pain.   Neurological:  Negative for tingling, weakness, numbness, headaches and paresthesias.   All other systems reviewed and are negative.      Physical Exam     Initial Vitals   BP Pulse Resp Temp SpO2   10/17/24 1056 10/17/24 1056 10/17/24 1055 10/17/24 1056 10/17/24 1056   118/78 100 20 97.9 °F (36.6 °C) 95 %      MAP       --                Physical Exam    Vitals reviewed.  Constitutional: He appears well-developed and well-nourished.   Neck: Neck supple.   Cardiovascular:  Normal rate and regular rhythm.           Pulmonary/Chest: Breath sounds normal.   Abdominal: Abdomen is soft. Bowel sounds are normal. He exhibits no distension and no mass. There is no abdominal tenderness. There is no rebound and no guarding.   Musculoskeletal:         General: Normal range of motion.      Cervical back: Normal and neck supple.      Thoracic back: Normal.      Lumbar back: Tenderness present. No swelling, edema, deformity, signs of trauma, lacerations, spasms or bony tenderness. Normal range of motion. No scoliosis.        Back:      Neurological: He is alert and oriented to person, place, and time. He has normal strength. GCS score is 15. GCS eye subscore is 4. GCS verbal subscore is 5. GCS motor subscore is 6.   Skin: Skin is warm and dry. Capillary refill takes less than 2 seconds.   Psychiatric: He has a normal mood and affect.         Medical Screening Exam   See Full Note    ED Course   Procedures  Labs Reviewed - No data to display       Imaging Results    None          Medications   ketorolac injection 60 mg (has no administration in time range)     Medical Decision Making  49-year-old male presents to the emergency department to be evaluated for low back pain.  He reports he began a few days ago.  Denies any recent fall or injury.  He was evaluated in clinic a few days ago and  prescribed some Norco that has not helped with his pain.  He is being referred to pain management and has an appointment in about a week from now.  Denies any numbness or weakness in his lower extremities, loss of control of bowels or bladder, dysuria, fever, chills.  Diagnosis: Low back pain  Treated with Toradol  Prescribed Robaxin and Motrin    Risk  Prescription drug management.                                      Clinical Impression:   Final diagnoses:  [S39.012A] Lumbar strain, initial encounter (Primary)        ED Disposition Condition    Discharge Stable          ED Prescriptions       Medication Sig Dispense Start Date End Date Auth. Provider    methocarbamoL (ROBAXIN) 500 MG Tab Take 2 tablets (1,000 mg total) by mouth 3 (three) times daily. for 5 days 30 tablet 10/17/2024 10/22/2024 Sarah Menchaca FNP    ibuprofen (ADVIL,MOTRIN) 800 MG tablet Take 1 tablet (800 mg total) by mouth 3 (three) times daily. for 5 days 15 tablet 10/17/2024 10/22/2024 Sarah Menchaca FNP          Follow-up Information    None          Sarah Menchaca FNP  10/17/24 0023

## 2024-10-17 NOTE — Clinical Note
"Mike Sparks" [place] was seen and treated in our emergency department on 10/17/2024.  He may return to work on 10/21/2024.       If you have any questions or concerns, please don't hesitate to call.      Rob Friend BSN RN    "